# Patient Record
Sex: MALE | Race: WHITE | NOT HISPANIC OR LATINO | Employment: FULL TIME | URBAN - METROPOLITAN AREA
[De-identification: names, ages, dates, MRNs, and addresses within clinical notes are randomized per-mention and may not be internally consistent; named-entity substitution may affect disease eponyms.]

---

## 2017-01-10 ENCOUNTER — HOSPITAL ENCOUNTER (OUTPATIENT)
Dept: LAB | Facility: MEDICAL CENTER | Age: 67
End: 2017-01-10
Attending: INTERNAL MEDICINE
Payer: COMMERCIAL

## 2017-01-10 PROCEDURE — 36415 COLL VENOUS BLD VENIPUNCTURE: CPT

## 2017-01-10 PROCEDURE — 84402 ASSAY OF FREE TESTOSTERONE: CPT

## 2017-01-10 PROCEDURE — 84403 ASSAY OF TOTAL TESTOSTERONE: CPT

## 2017-01-10 PROCEDURE — 84270 ASSAY OF SEX HORMONE GLOBUL: CPT

## 2017-01-12 LAB
SHBG SERPL-SCNC: 39 NMOL/L (ref 11–80)
TESTOST FREE MFR SERPL: 1.6 % (ref 1.6–2.9)
TESTOST FREE SERPL-MCNC: 50 PG/ML (ref 47–244)
TESTOST SERPL-MCNC: 304 NG/DL (ref 300–720)

## 2017-01-23 RX ORDER — IBUPROFEN 800 MG/1
800 TABLET ORAL EVERY 8 HOURS PRN
Status: ON HOLD | COMMUNITY
End: 2017-01-24

## 2017-01-24 ENCOUNTER — HOSPITAL ENCOUNTER (INPATIENT)
Facility: MEDICAL CENTER | Age: 67
LOS: 1 days | DRG: 038 | End: 2017-01-25
Attending: SURGERY | Admitting: SURGERY
Payer: COMMERCIAL

## 2017-01-24 PROBLEM — I65.23 BILATERAL CAROTID ARTERY STENOSIS: Status: ACTIVE | Noted: 2017-01-24

## 2017-01-24 LAB
GLUCOSE BLD-MCNC: 111 MG/DL (ref 65–99)
GLUCOSE BLD-MCNC: 123 MG/DL (ref 65–99)

## 2017-01-24 PROCEDURE — 501445 HCHG STAPLER, SKIN DISP: Performed by: SURGERY

## 2017-01-24 PROCEDURE — 770006 HCHG ROOM/CARE - MED/SURG/GYN SEMI*

## 2017-01-24 PROCEDURE — 88300 SURGICAL PATH GROSS: CPT

## 2017-01-24 PROCEDURE — A4450 NON-WATERPROOF TAPE: HCPCS | Performed by: SURGERY

## 2017-01-24 PROCEDURE — 501498 HCHG SURG-I-LOOP, MAXI (BLUE): Performed by: SURGERY

## 2017-01-24 PROCEDURE — 110371 HCHG SHELL REV 272: Performed by: SURGERY

## 2017-01-24 PROCEDURE — 160041 HCHG SURGERY MINUTES - EA ADDL 1 MIN LEVEL 4: Performed by: SURGERY

## 2017-01-24 PROCEDURE — 700111 HCHG RX REV CODE 636 W/ 250 OVERRIDE (IP): Performed by: SURGERY

## 2017-01-24 PROCEDURE — 110382 HCHG SHELL REV 271: Performed by: SURGERY

## 2017-01-24 PROCEDURE — 501422 HCHG SPONGE, SURGIFOAM 100: Performed by: SURGERY

## 2017-01-24 PROCEDURE — 501838 HCHG SUTURE GENERAL: Performed by: SURGERY

## 2017-01-24 PROCEDURE — 51798 US URINE CAPACITY MEASURE: CPT

## 2017-01-24 PROCEDURE — 500043 HCHG BAG-A-JET: Performed by: SURGERY

## 2017-01-24 PROCEDURE — 700111 HCHG RX REV CODE 636 W/ 250 OVERRIDE (IP)

## 2017-01-24 PROCEDURE — 500888 HCHG PACK, MINOR BASIN: Performed by: SURGERY

## 2017-01-24 PROCEDURE — 160048 HCHG OR STATISTICAL LEVEL 1-5: Performed by: SURGERY

## 2017-01-24 PROCEDURE — 03CK0ZZ EXTIRPATION OF MATTER FROM RIGHT INTERNAL CAROTID ARTERY, OPEN APPROACH: ICD-10-PCS | Performed by: SURGERY

## 2017-01-24 PROCEDURE — C2628 CATHETER, OCCLUSION: HCPCS | Performed by: SURGERY

## 2017-01-24 PROCEDURE — 770022 HCHG ROOM/CARE - ICU (200)

## 2017-01-24 PROCEDURE — 501837 HCHG SUTURE CV: Performed by: SURGERY

## 2017-01-24 PROCEDURE — 160035 HCHG PACU - 1ST 60 MINS PHASE I: Performed by: SURGERY

## 2017-01-24 PROCEDURE — 700105 HCHG RX REV CODE 258: Performed by: SURGERY

## 2017-01-24 PROCEDURE — 03CM0ZZ EXTIRPATION OF MATTER FROM RIGHT EXTERNAL CAROTID ARTERY, OPEN APPROACH: ICD-10-PCS | Performed by: SURGERY

## 2017-01-24 PROCEDURE — 160036 HCHG PACU - EA ADDL 30 MINS PHASE I: Performed by: SURGERY

## 2017-01-24 PROCEDURE — 502240 HCHG MISC OR SUPPLY RC 0272: Performed by: SURGERY

## 2017-01-24 PROCEDURE — 160009 HCHG ANES TIME/MIN: Performed by: SURGERY

## 2017-01-24 PROCEDURE — 500698 HCHG HEMOCLIP, MEDIUM: Performed by: SURGERY

## 2017-01-24 PROCEDURE — 501499 HCHG SURG-I-LOOP, MINI (BLUE): Performed by: SURGERY

## 2017-01-24 PROCEDURE — 82962 GLUCOSE BLOOD TEST: CPT

## 2017-01-24 PROCEDURE — 700101 HCHG RX REV CODE 250

## 2017-01-24 PROCEDURE — 160002 HCHG RECOVERY MINUTES (STAT): Performed by: SURGERY

## 2017-01-24 PROCEDURE — 700105 HCHG RX REV CODE 258

## 2017-01-24 PROCEDURE — 160029 HCHG SURGERY MINUTES - 1ST 30 MINS LEVEL 4: Performed by: SURGERY

## 2017-01-24 PROCEDURE — 500700 HCHG HEMOCLIP, SMALL (RED): Performed by: SURGERY

## 2017-01-24 RX ORDER — ATORVASTATIN CALCIUM 40 MG/1
40 TABLET, FILM COATED ORAL NIGHTLY
Status: ON HOLD | COMMUNITY
End: 2017-02-14

## 2017-01-24 RX ORDER — SODIUM CHLORIDE 9 MG/ML
INJECTION, SOLUTION INTRAVENOUS
Status: COMPLETED
Start: 2017-01-24 | End: 2017-01-24

## 2017-01-24 RX ORDER — ALPRAZOLAM 0.5 MG/1
0.5 TABLET ORAL
COMMUNITY
End: 2018-08-29

## 2017-01-24 RX ORDER — OXYCODONE HYDROCHLORIDE 5 MG/1
10 TABLET ORAL
Status: DISCONTINUED | OUTPATIENT
Start: 2017-01-24 | End: 2017-01-25 | Stop reason: HOSPADM

## 2017-01-24 RX ORDER — LISINOPRIL 20 MG/1
20 TABLET ORAL DAILY
COMMUNITY
End: 2017-02-13

## 2017-01-24 RX ORDER — HYDROCHLOROTHIAZIDE 12.5 MG/1
12.5 CAPSULE, GELATIN COATED ORAL DAILY
COMMUNITY
End: 2018-06-07

## 2017-01-24 RX ORDER — ONDANSETRON 2 MG/ML
4 INJECTION INTRAMUSCULAR; INTRAVENOUS EVERY 4 HOURS PRN
Status: DISCONTINUED | OUTPATIENT
Start: 2017-01-24 | End: 2017-01-25 | Stop reason: HOSPADM

## 2017-01-24 RX ORDER — LIDOCAINE HYDROCHLORIDE 10 MG/ML
INJECTION, SOLUTION INFILTRATION; PERINEURAL
Status: COMPLETED
Start: 2017-01-24 | End: 2017-01-24

## 2017-01-24 RX ORDER — SODIUM CHLORIDE 9 MG/ML
1000 INJECTION, SOLUTION INTRAVENOUS
Status: COMPLETED | OUTPATIENT
Start: 2017-01-24 | End: 2017-01-25

## 2017-01-24 RX ORDER — MAGNESIUM HYDROXIDE 1200 MG/15ML
LIQUID ORAL
Status: DISCONTINUED | OUTPATIENT
Start: 2017-01-24 | End: 2017-01-24 | Stop reason: HOSPADM

## 2017-01-24 RX ORDER — HYDROMORPHONE HYDROCHLORIDE 2 MG/ML
INJECTION, SOLUTION INTRAMUSCULAR; INTRAVENOUS; SUBCUTANEOUS
Status: COMPLETED
Start: 2017-01-24 | End: 2017-01-24

## 2017-01-24 RX ORDER — IBUPROFEN 200 MG
800 TABLET ORAL
COMMUNITY
End: 2019-02-01

## 2017-01-24 RX ORDER — OXYCODONE HYDROCHLORIDE 5 MG/1
5 TABLET ORAL
Status: DISCONTINUED | OUTPATIENT
Start: 2017-01-24 | End: 2017-01-25 | Stop reason: HOSPADM

## 2017-01-24 RX ADMIN — LIDOCAINE HYDROCHLORIDE: 10 INJECTION, SOLUTION INFILTRATION; PERINEURAL at 14:15

## 2017-01-24 RX ADMIN — SODIUM CHLORIDE 1000 ML: 9 INJECTION, SOLUTION INTRAVENOUS at 14:34

## 2017-01-24 RX ADMIN — CEFAZOLIN SODIUM 1000 MG: 1 INJECTION, SOLUTION INTRAVENOUS at 23:06

## 2017-01-24 RX ADMIN — HYDROMORPHONE HYDROCHLORIDE 0.5 MG: 2 INJECTION INTRAMUSCULAR; INTRAVENOUS; SUBCUTANEOUS at 18:32

## 2017-01-24 RX ADMIN — POTASSIUM CHLORIDE: 2 INJECTION, SOLUTION, CONCENTRATE INTRAVENOUS at 23:19

## 2017-01-24 RX ADMIN — SODIUM CHLORIDE 100 ML: 9 INJECTION, SOLUTION INTRAVENOUS at 17:45

## 2017-01-24 RX ADMIN — PHENYLEPHRINE HYDROCHLORIDE 40 MG: 10 INJECTION INTRAVENOUS at 17:45

## 2017-01-24 ASSESSMENT — COPD QUESTIONNAIRES
DO YOU EVER COUGH UP ANY MUCUS OR PHLEGM?: NO/ONLY WITH OCCASIONAL COLDS OR INFECTIONS
COPD SCREENING SCORE: 2
DURING THE PAST 4 WEEKS HOW MUCH DID YOU FEEL SHORT OF BREATH: NONE/LITTLE OF THE TIME
HAVE YOU SMOKED AT LEAST 100 CIGARETTES IN YOUR ENTIRE LIFE: NO/DON'T KNOW

## 2017-01-24 ASSESSMENT — PAIN SCALES - GENERAL
PAINLEVEL_OUTOF10: 0
PAINLEVEL_OUTOF10: 0
PAINLEVEL_OUTOF10: 3
PAINLEVEL_OUTOF10: 0
PAINLEVEL_OUTOF10: 1
PAINLEVEL_OUTOF10: 0

## 2017-01-24 ASSESSMENT — LIFESTYLE VARIABLES: EVER_SMOKED: NEVER

## 2017-01-24 NOTE — IP AVS SNAPSHOT
" <p align=\"LEFT\"><IMG SRC=\"//[email or web address]\" alt=\"Image\" WIDTH=\"50%\" HEIGHT=\"200\" BORDER=\"\"></p>                   Name:Yoan Ortega  Medical Record Number:[id number]  CSN: [id number]    YOB: 1950   Age: 66 y.o.  Sex: male  HT:1.778 m (5' 10\") WT: 112.7 kg (248 lb 7.3 oz)          Admit Date: 1/24/2017     Discharge Date:   Today's Date: 1/25/2017  Attending Doctor:  Dominik Soriano M.D.                  Allergies:  Review of patient's allergies indicates no known allergies.          Your appointments     Feb 02, 2017  2:30 PM   NEW PATIENT with Raghav Díaz MD,[facility] (--)    [address]  [facility] [id number] [phone]              Follow-up Information     1. Schedule an appointment as soon as possible for a visit with Dominik Soriano M.D..    Specialties:  Surgery, Radiology    Why:  please call office to follow up in 1-2 weeks    Contact information    [address]  [facility] [id number] [phone]           Medication List      Take these Medications        Instructions    alprazolam 0.5 MG Tabs   Commonly known as:  XANAX    Take 0.5 mg by mouth 1 time daily as needed for Sleep.   Dose:  0.5 mg       aspirin EC 81 MG Tbec   Commonly known as:  ECOTRIN    Take 81 mg by mouth every day.   Dose:  81 mg       atorvastatin 40 MG Tabs   Commonly known as:  LIPITOR    Take 40 mg by mouth every evening.   Dose:  40 mg       CENTRUM Tabs    Take 1 Tab by mouth every day.   Dose:  1 Tab       hydrochlorothiazide 12.5 MG capsule   Commonly known as:  MICROZIDE    Take 12.5 mg by mouth every day.   Dose:  12.5 mg       ibuprofen 200 MG Tabs   Commonly known as:  MOTRIN    Take 800 mg by mouth 1 time daily as needed.   Dose:  800 mg       insulin lispro 100 UNIT/ML Soln   Commonly known as:  HUMALOG    Inject 13-22 Units as instructed 2 Times a Day. 22 units and 13 units in pm   Dose: "  13-22 Units       lisinopril 20 MG Tabs   Commonly known as:  PRINIVIL    Take 20 mg by mouth every day.   Dose:  20 mg       TOUJEO SOLOSTAR 300 UNIT/ML Sopn   Generic drug:  Insulin Glargine    Inject 52 Units as instructed every day.   Dose:  52 Units       VITAMIN D PO    Take 1 Tab by mouth every day.   Dose:  1 Tab

## 2017-01-24 NOTE — IP AVS SNAPSHOT
1/25/2017          Yoan Dacosta Shannon  [address]  [place] [address]    Dear Yoan:    [facility] wants to ensure your discharge home is safe and you or your loved ones have had all your questions answered regarding your care after you leave the hospital.    You may receive a telephone call within two days of your discharge.  This call is to make certain you understand your discharge instructions as well as ensure we provided you with the best care possible during your stay with us.     The call will only last approximately 3-5 minutes and will be done by a nurse.    Once again, we want to ensure your discharge home is safe and that you have a clear understanding of any next steps in your care.  If you have any questions or concerns, please do not hesitate to contact us, we are here for you.  Thank you for choosing [facility] for your healthcare needs.    Sincerely,    Zafar Tate    [facility]

## 2017-01-24 NOTE — IP AVS SNAPSHOT
" After Visit Summary                                                                                                                  Name:Yoan Ortega  Medical Record Number:[id number]  CSN: [id number]    YOB: 1950   Age: 66 y.o.  Sex: male  HT:1.778 m (5' 10\") WT: 112.7 kg (248 lb 7.3 oz)          Admit Date: 1/24/2017     Discharge Date:   Today's Date: 1/25/2017  Attending Doctor:  Dominik Soriano M.D.                  Allergies:  Review of patient's allergies indicates no known allergies.            Discharge Instructions       Discharge Instructions    Discharged to home by car with relative. Discharged via walking, hospital escort: Refused.  Special equipment needed: Not Applicable    Be sure to schedule a follow-up appointment with your primary care doctor or any specialists as instructed.     Discharge Plan:   Diet Plan: Discussed  Activity Level: Discussed  Confirmed Follow up Appointment: Patient to Call and Schedule Appointment  Confirmed Symptoms Management: Discussed  Medication Reconciliation Updated: Yes  Influenza Vaccine Indication: Not indicated: Previously immunized this influenza season and > 8 years of age    I understand that a diet low in cholesterol, fat, and sodium is recommended for good health. Unless I have been given specific instructions below for another diet, I accept this instruction as my diet prescription.   Other diet: diabetic     Special Instructions:   Pt to hold home BP meds for 48 hrs   F/u 1-2 weeks    · Is patient discharged on Warfarin / Coumadin?   No     · Is patient Post Blood Transfusion?  No    Depression / Suicide Risk    As you are discharged from this [facility], it is important to learn how to keep safe from harming yourself.    Recognize the warning signs:  · Abrupt changes in personality, positive or negative- including increase in energy   · Giving away possessions  · Change in eating patterns- significant weight changes-  " positive or negative  · Change in sleeping patterns- unable to sleep or sleeping all the time   · Unwillingness or inability to communicate  · Depression  · Unusual sadness, discouragement and loneliness  · Talk of wanting to die  · Neglect of personal appearance   · Rebelliousness- reckless behavior  · Withdrawal from people/activities they love  · Confusion- inability to concentrate     If you or a loved one observes any of these behaviors or has concerns about self-harm, here's what you can do:  · Talk about it- your feelings and reasons for harming yourself  · Remove any means that you might use to hurt yourself (examples: pills, rope, extension cords, firearm)  · Get professional help from the community (Mental Health, Substance Abuse, psychological counseling)  · Do not be alone:Call your Safe Contact- someone whom you trust who will be there for you.  · Call your local CRISIS HOTLINE 268-4540 or 412-498-3321  · Call your local Children's Mobile Crisis Response Team [place] (515) 350-6239 or www.T[employer]ellus  · Call the toll free National Suicide Prevention Hotlines   · National Suicide Prevention Lifeline 928-299-DLAR (9151)  · National Hope Line Network 800-SUICIDE (542-4843)      Carotid Stenosis and Carotid Endarterectomy  The carotid arteries are the large arteries in the neck that supply blood to the brain. Carotid stenosis is the narrowing of the carotid arteries. This narrowing can put you at risk for a stroke because it decreases blood flow to the brain (ischemic stroke or transient ischemic attack [TIA]). Carotid stenosis is also called carotid artery disease.  CAUSES   Carotid stenosis is usually caused by a buildup of plaque in the arteries. Plaque is also called atherosclerosis. Plaque can build up in any of your arteries. This commonly occurs as we grow older.  SYMPTOMS   Symptoms of decreased blood to your brain include:   · Feeling faint.  · Numbness or weakness in the arms or  legs.  · Difficulty with movements.  · Difficulty speaking.  · Vision problems.  If the arteries are left untreated, you are at risk of having a stroke or TIA.  TREATMENT   Nonsurgical Treatment  If appropriate, your caregiver will suggest medical options other than surgery. These include taking aspirin or other medicines that thin your blood (anticoagulants). Your caregiver can help you decide if these are acceptable treatment methods for you.  Surgical Treatment: Carotid Endarterectomy  A carotid endarterectomy is a surgical procedure to clear blockages in the carotid artery.  RISKS AND COMPLICATIONS  · Bleeding.  · Infection.  · Stroke or TIA.  BEFORE THE PROCEDURE   · Do not eat or drink for as long as directed by your caregiver before the procedure.  · Your caregiver will advise you if there are any medicines that need to be withheld before the surgery, and for how long.  PROCEDURE   You will be given a drug to make you sleep (general anesthesia). After you receive the anesthetic, the surgeon will make a small cut (incision) in your neck to expose the artery. An incision is made in the artery and the plaque is removed. The artery is then repaired and the incision is closed in your neck with stitches (sutures).  AFTER THE PROCEDURE   You will stay in the hospital right after surgery. Recovery time varies, depending on your age, condition, general health, and other factors. You are usually able to return to a normal lifestyle within a few weeks.  HOME CARE INSTRUCTIONS   · It is normal to be sore for a couple weeks after surgery. See your caregiver if this seems to be getting worse rather than better.  · Take showers, not baths, for a few days after surgery or until instructed otherwise by your caregiver. Do not take a bath or swim until directed by your surgeon.  · Only take over-the-counter or prescription medicines for pain, discomfort, or fever as directed by your caregiver.  · An anticoagulant may be  prescribed after surgery. This medicine should be taken exactly as directed.  · Change bandages (dressings) as directed by your caregiver.  · Resume your normal activities as directed by your caregiver.  · Avoid lifting until you are instructed otherwise.  · Make an appointment to see your caregiver for suture or staple removal when instructed.  · Stop smoking if you smoke. This is a grave risk factor.  · Stop taking the pill (oral contraceptives) unless your caregiver recommends otherwise.  · Maintain good blood pressure control.  · Exercise regularly or as instructed.  · Lower blood lipids (cholesterol and triglycerides).  · Eat a heart-healthy diet. Manage heart problems if they are contributing to your risk.  SEEK MEDICAL CARE IF:   · There is increased bleeding from the wound.  · You notice redness, swelling, or increasing pain in the wound.  · You notice swelling in your neck or have difficulty breathing or talking.  · You notice a bad smell or pus coming from the wound or dressing.  · You have an oral temperature above 102° F (38.9° C).  SEEK IMMEDIATE MEDICAL CARE IF:   · Your initial symptoms are getting worse instead of better.  · You develop any abnormal bruising or bleeding.  · You develop a rash.  · You have difficulty breathing.  · You develop any reaction or side effects to medicine given.  · You develop chest pain, shortness of breath, or pain or swelling in your legs.  · You have a return of symptoms or problems that caused you to have this surgery.  · You develop a temporary loss of vision.  · You develop temporary numbness on one side.  · You develop a temporary inability to speak (aphasia).  · You develop temporary areas of weakness.  · You have problems or concerns that have not been answered.  MAKE SURE YOU:   · Understand these instructions.  · Will watch your condition.  · Will get help right away if you are not doing well or get worse.  Document Released: 12/15/2001 Document Revised:  03/11/2013 Document Reviewed: 06/25/2010  ExitCare® Patient Information ©2014 [employer].  Carotid Endarterectomy, Care After  Refer to this sheet in the next few weeks. These instructions provide you with information on caring for yourself after your procedure. Your health care provider may also give you more specific instructions. Your treatment has been planned according to current medical practices, but problems sometimes occur. Call your health care provider if you have any problems or questions after your procedure.   WHAT TO EXPECT AFTER THE PROCEDURE  You may have some pain or an ache in your neck for up to 2 weeks. This is normal. Recovery time varies depending on your age, condition, general health, and other factors. You will likely be able to return to a normal lifestyle within a few weeks.   HOME CARE INSTRUCTIONS   · Take showers if your health care provider approves. Do not take baths, swim, or use a hot tub until your health care provider approves.    · Take medicines only as directed by your health care provider. If a blood thinner (anticoagulant) is prescribed after surgery, take this medicine exactly as directed.  · Change bandages (dressings) as directed by your health care provider.    · Avoid heavy lifting or strenuous activity until your health care provider says it is okay. Resume your normal activities as directed.    · Stop smoking if you smoke. This is a risk factor for poor wound healing.    · Stop taking the pill (oral contraceptives) unless your health care provider recommends otherwise.    · Maintain good control of your blood pressure.    · Exercise regularly or as instructed by your health care provider.    · Eat a heart-healthy diet. Talk to your health care provider about how to lower blood lipids (cholesterol and triglycerides).    · Keep all follow-up visits as directed by your health care provider. Make an appointment for the removal of stitches (sutures) or staples.  SEEK  MEDICAL CARE IF:   · You have increased bleeding from the incision site.    · You notice redness, swelling, or increasing pain at the incision site.    · You notice swelling in your neck or have difficulty breathing or talking.    · You notice a bad smell or pus coming from the incision site or dressing.    · You have a fever.   · You develop a rash.    · You develop any reaction or side effects to medicine given.    SEEK IMMEDIATE MEDICAL CARE IF:   · Your initial symptoms are getting worse rather than better.    · You develop any abnormal bruising or bleeding.    · You have difficulty breathing.    · You develop chest pain, shortness of breath, or pain or swelling in your legs.    · You have a return of symptoms or problems that caused you to have this surgery.    · You develop a temporary loss of vision.    · You develop temporary numbness on one side.    · You develop a temporary inability to speak (aphasia).    · You develop temporary weakness.    MAKE SURE YOU:   · Understand these instructions.  · Will watch your condition.  · Will get help right away if you are not doing well or get worse.     This information is not intended to replace advice given to you by your health care provider. Make sure you discuss any questions you have with your health care provider.     Document Released: 07/07/2006 Document Revised: 01/08/2016 Document Reviewed: 05/21/2014  [employer] Interactive Patient Education ©2016 [employer] Inc.      Your appointments     Feb 02, 2017  2:30 PM   NEW PATIENT with Raghav Díaz MD,[facility] ([facility]    [address]  [facility] [id number] [phone]              Follow-up Information     1. Schedule an appointment as soon as possible for a visit with Dominik Soriano M.D..    Specialties:  Surgery, Radiology    Why:  please call office to follow up in 1-2 weeks    Contact information    [address]  [place] [address]  [phone]           Discharge Medication Instructions:    Below are the medications your physician expects you to take upon discharge:    Review all your home medications and newly ordered medications with your doctor and/or pharmacist. Follow medication instructions as directed by your doctor and/or pharmacist.    Please keep your medication list with you and share with your physician.               Medication List      CONTINUE taking these medications        Instructions    alprazolam 0.5 MG Tabs   Commonly known as:  XANAX    Take 0.5 mg by mouth 1 time daily as needed for Sleep.   Dose:  0.5 mg       aspirin EC 81 MG Tbec   Commonly known as:  ECOTRIN    Take 81 mg by mouth every day.   Dose:  81 mg       atorvastatin 40 MG Tabs   Commonly known as:  LIPITOR    Take 40 mg by mouth every evening.   Dose:  40 mg       CENTRUM Tabs    Take 1 Tab by mouth every day.   Dose:  1 Tab       hydrochlorothiazide 12.5 MG capsule   Commonly known as:  MICROZIDE    Take 12.5 mg by mouth every day.   Dose:  12.5 mg       ibuprofen 200 MG Tabs   Commonly known as:  MOTRIN    Take 800 mg by mouth 1 time daily as needed.   Dose:  800 mg       insulin lispro 100 UNIT/ML Soln   Commonly known as:  HUMALOG    Inject 13-22 Units as instructed 2 Times a Day. 22 units and 13 units in pm   Dose:  13-22 Units       lisinopril 20 MG Tabs   Commonly known as:  PRINIVIL    Take 20 mg by mouth every day.   Dose:  20 mg       TOUJEO SOLOSTAR 300 UNIT/ML Sopn   Generic drug:  Insulin Glargine    Inject 52 Units as instructed every day.   Dose:  52 Units       VITAMIN D PO    Take 1 Tab by mouth every day.   Dose:  1 Tab               Instructions           Diet / Nutrition:    Follow any diet instructions given to you by your doctor or the dietician, including how much salt (sodium) you are allowed each day.    If you are overweight, talk to your doctor about a weight reduction plan.    Activity:    Remain  physically active following your doctor's instructions about exercise and activity.    Rest often.     Any time you become even a little tired or short of breath, SIT DOWN and rest.    Worsening Symptoms:    Report any of the following signs and symptoms to the doctor's office immediately:    *Pain of jaw, arm, or neck  *Chest pain not relieved by medication                               *Dizziness or loss of consciousness  *Difficulty breathing even when at rest   *More tired than usual                                       *Bleeding drainage or swelling of surgical site  *Swelling of feet, ankles, legs or stomach                 *Fever (>100ºF)  *Pink or blood tinged sputum  *Weight gain (3lbs/day or 5lbs /week)           *Shock from internal defibrillator (if applicable)  *Palpitations or irregular heartbeats                *Cool and/or numb extremities    Stroke Awareness    Common Risk Factors for Stroke include:    Age  Atrial Fibrillation  Carotid Artery Stenosis  Diabetes Mellitus  Excessive alcohol consumption  High blood pressure  Overweight   Physical inactivity  Smoking    Warning signs and symptoms of a stroke include:    *Sudden numbness or weakness of the face, arm or leg (especially on one side of the body).  *Sudden confusion, trouble speaking or understanding.  *Sudden trouble seeing in one or both eyes.  *Sudden trouble walking, dizziness, loss of balance or coordination.Sudden severe headache with no known cause.    It is very important to get treatment quickly when a stroke occurs. If you experience any of the above warning signs, call 911 immediately.                   Disclaimer         Quit Smoking / Tobacco Use:    I understand the use of any tobacco products increases my chance of suffering from future heart disease or stroke and could cause other illnesses which may shorten my life. Quitting the use of tobacco products is the single most important thing I can do to improve my health. For  further information on smoking / tobacco cessation call a Toll Free Quit Line at 1-624.706.8494 (*National [employer] [place]) or 1-850.815.1042 (American Lung Association) or you can access the web based program at www.lungusa.org.    [place] Tobacco Users Help Line:  (113) 926-5372       Toll Free: 1-684.295.7195  Quit Tobacco Program [facility] (709)726-1274    Crisis Hotline:    [place] Crisis Hotline:  7-612-AUNNJLL or 1-212.424.3300    [place] Crisis Hotline:    1-691.376.9621 or 968-929-5655    Discharge Survey:   Thank you for choosing [facility]. We hope we did everything we could to make your hospital stay a pleasant one. You may be receiving a phone survey and we would appreciate your time and participation in answering the questions. Your input is very valuable to us in our efforts to improve our service to our patients and their families.        My signature on this form indicates that:    1. I have reviewed and understand the above information.  2. My questions regarding this information have been answered to my satisfaction.  3. I have formulated a plan with my discharge nurse to obtain my prescribed medications for home.                  Disclaimer         __________________________________                     __________       ________                       Patient Signature                                                 Date                    Time

## 2017-01-24 NOTE — IP AVS SNAPSHOT
[employer] Access Code: [email or web address]  Expires: 2/24/2017 11:28 AM    [employer]  A secure, online tool to manage your health information     [employer]’s [employer]® is a secure, online tool that connects you to your personalized health information from the privacy of your home -- day or night - making it very easy for you to manage your healthcare. Once the activation process is completed, you can even access your medical information using the [employer] payton, which is available for free in the Apple Payton store or Google Play store.     [employer] provides the following levels of access (as shown below):   My Chart Features   [facility] Primary Care Doctor [facility]  Specialists [facility]  Urgent  Care Non-[facility]  Primary Care  Doctor   Email your healthcare team securely and privately 24/7 X X X X   Manage appointments: schedule your next appointment; view details of past/upcoming appointments X      Request prescription refills. X      View recent personal medical records, including lab and immunizations X X X X   View health record, including health history, allergies, medications X X X X   Read reports about your outpatient visits, procedures, consult and ER notes X X X X   See your discharge summary, which is a recap of your hospital and/or ER visit that includes your diagnosis, lab results, and care plan. X X       How to register for [employer]:  1. Go to  [email or web address].  2. Click on the Sign Up Now box, which takes you to the New Member Sign Up page. You will need to provide the following information:  a. Enter your [employer] Access Code exactly as it appears at the top of this page. (You will not need to use this code after you’ve completed the sign-up process. If you do not sign up before the expiration date, you must request a new code.)   b. Enter your date of birth.   c. Enter your home email address.   d. Click Submit, and follow the next screen’s instructions.  3. Create a [employer] ID. This will be your [employer]  login ID and cannot be changed, so think of one that is secure and easy to remember.  4. Create a [employer] password. You can change your password at any time.  5. Enter your Password Reset Question and Answer. This can be used at a later time if you forget your password.   6. Enter your e-mail address. This allows you to receive e-mail notifications when new information is available in Ring[employer].  7. Click Sign Up. You can now view your health information.    For assistance activating your [employer] account, call [phone]

## 2017-01-25 VITALS
OXYGEN SATURATION: 94 % | TEMPERATURE: 98.8 F | BODY MASS INDEX: 35.57 KG/M2 | HEIGHT: 70 IN | WEIGHT: 248.46 LBS | HEART RATE: 96 BPM | RESPIRATION RATE: 23 BRPM

## 2017-01-25 LAB — GLUCOSE BLD-MCNC: 97 MG/DL (ref 65–99)

## 2017-01-25 PROCEDURE — 700105 HCHG RX REV CODE 258: Performed by: SURGERY

## 2017-01-25 PROCEDURE — 700111 HCHG RX REV CODE 636 W/ 250 OVERRIDE (IP): Performed by: SURGERY

## 2017-01-25 PROCEDURE — 82962 GLUCOSE BLOOD TEST: CPT

## 2017-01-25 RX ADMIN — PHENYLEPHRINE HYDROCHLORIDE 45 MCG/MIN: 10 INJECTION INTRAVENOUS at 01:04

## 2017-01-25 RX ADMIN — CEFAZOLIN SODIUM 1000 MG: 1 INJECTION, SOLUTION INTRAVENOUS at 05:54

## 2017-01-25 ASSESSMENT — LIFESTYLE VARIABLES
ON A TYPICAL DAY WHEN YOU DRINK ALCOHOL HOW MANY DRINKS DO YOU HAVE: 1
HAVE YOU EVER FELT YOU SHOULD CUT DOWN ON YOUR DRINKING: NO
EVER_SMOKED: NEVER
TOTAL SCORE: 0
ALCOHOL_USE: YES
HOW MANY TIMES IN THE PAST YEAR HAVE YOU HAD 5 OR MORE DRINKS IN A DAY: 2
EVER HAD A DRINK FIRST THING IN THE MORNING TO STEADY YOUR NERVES TO GET RID OF A HANGOVER: NO
CONSUMPTION TOTAL: POSITIVE
AVERAGE NUMBER OF DAYS PER WEEK YOU HAVE A DRINK CONTAINING ALCOHOL: 0
TOTAL SCORE: 0
HAVE PEOPLE ANNOYED YOU BY CRITICIZING YOUR DRINKING: NO
TOTAL SCORE: 0
EVER FELT BAD OR GUILTY ABOUT YOUR DRINKING: NO

## 2017-01-25 ASSESSMENT — PAIN SCALES - GENERAL
PAINLEVEL_OUTOF10: 1
PAINLEVEL_OUTOF10: 0

## 2017-01-25 NOTE — OP REPORT
DATE OF SERVICE:  01/24/2017    OPERATING SURGEON:  Dominik Soriano MD    ASSISTANT:  Yoan Craig MD    PROCEDURE:  Right carotid endarterectomy.    ANESTHESIA:  General.    ESTIMATED BLOOD LOSS:  Less than 5 mL.    PREOPERATIVE DIAGNOSIS:  Critical right internal carotid artery stenosis with   contralateral occlusion.    POSTOPERATIVE DIAGNOSIS:  Critical right internal carotid artery stenosis with   contralateral occlusion.    SUMMARY:  A 66-year-old male who has ASCVD presented with visual changes in   his left eye including transient field cut and then central vision loss.  He   was evaluated by the emergency room and noted to have a left internal carotid   artery occlusion and high-grade right internal carotid artery stenosis.    The patient was evaluated by ophthalmology with the recommendation of a   prophylactic right carotid endarterectomy to reduce risk of stroke as well as   further eye damage on the contralateral side.    DESCRIPTION OF PROCEDURE:  The patient was taken to the operating room and   satisfactory general anesthesia was induced via endotracheal intubation.    Patient was placed in hyperextended position of the shoulders with the head   turned to the left.  The right neck was prepped and draped.  Longitudinal   incision was made through the skin and subcutaneous tissue with extension   through the platysma.  Self-retaining retractors were placed in the depths of   the wound and the common as well as external and internal carotid vessels were   skeletonized and circled with tapes.    The patient was heparinized with 5000 units of heparin intravenously.    The external followed by the internal and the common carotid vessels were   crossclamped.  Longitudinal arteriotomy was made from the common onto the   internal carotid artery beyond the plaque and a Squires-Inahara shunt was   inserted distally, back bled and inserted proximally with bleeding out of the   ____ prior to  reestablishing flow to the brain.  Endarterectomy was performed   with a spatula and eversion-type endarterectomy in the external carotid.    The vessel bed was irrigated copiously with heparinized saline and closed   primarily with running 5-0 and 6-0 Prolene suture.  The shunt was withdrawn   and the suture line back bled ____ secured and flow established from the   common into the external and the internal carotid vessel.    Good diastolic signal was noted in the distal internal carotid artery.    Patient was reversed with protamine sulfate.  Topical thrombin and Gelfoam   were used for hemostasis.  The wound was closed with running 3-0 Vicryl on the   platysma and running 4-0 Vicryl subcuticular skin stitches.  Wound was   dressed and patient sent to recovery room in good condition.  Specimen sent to   pathology, labeled carotid plaque.       ____________________________________     MD ARNOLDO BAIRES / NOELLE    DD:  01/24/2017 17:02:56  DT:  01/24/2017 17:45:51    D#:  [id number]  Job#:  [id number]    cc: Yoan Craig MD

## 2017-01-25 NOTE — OR SURGEON
Immediate Post-Operative Note      PreOp Diagnosis: critical right ICA stenosis    PostOp Diagnosis:   same    Procedure(s) (LRB):  CAROTID ENDARTERECTOMY (Right)    Surgeon(s):  GOKUL Palmer M.D.    Anesthesiologist/Type of Anesthesia:  Anesthesiologist: Ricardo Elkins M.D./General    Surgical Staff:  Circulator: Renetta Malin R.N.  Scrub Person: Richardson Maya    Specimen:   plaque    Estimated Blood Loss:   scant    Findings:       Complications:           1/24/2017 4:59 PM Dominik Soriano

## 2017-01-25 NOTE — PROGRESS NOTES
Late entry    Bedside report in PACU received from Roderick GEORGES. Pt arrived to unit, S105, with ACLS RN and CCT on transport monitor at 2250. Pt placed on ICU monitor. SCDs placed. Oriented to staff, ICU policy, and current plan of care. Pt verbalizes agreement. Pt A&Ox4, denies pain, numbness, tingling and nausea.     Chart reviewed, orders reviewed.     Pt with IV phenylephrine, titrating per MAR and MD order.     2330: Per pt, voided last at 1400 1/24. Pt attempted to void via urinal. Roughly 10 cc urine out. Bladder scan ordered per MD. Orders to bladder scan if urine >200. Result 340 mL. Discussed results with pt and MD order. Pt refuses to be straight cath. Encouraged to drink fluids and pt agreed to try and void in one hour.

## 2017-01-25 NOTE — PROGRESS NOTES
Pt BP remains stable with SBP> 110 and patient asymptomatic.  Clarified with Dr Soriano and reviewed vitals, OK to discharge per MD.  Pt ready to discharge, wife at bedside, reviewed discharge instructions, questions and concerns addressed, all lines and devices removed from pt.  All personal belongings sent with pt.  Pt refusing escort.

## 2017-01-25 NOTE — OR NURSING
PATIENT B/P DECREASING. DR. HESTER AT BEDSIDE TO TREAT PT WITH PHENYLEPHRINE BOLUS. MONITOR FOR RESPONSES. ALSO TO GIVE 1L BOLUS LR NOW

## 2017-01-25 NOTE — CARE PLAN
Problem: Safety  Goal: Will remain free from injury  Pt oriented to current plan of care. Safety and fall precautions in place. Encouraged pt to use call light for assistance to use the urinal. Pt return demonstrates appropriately. Non skid socks in place, bed in lowest position with bed brakes and alarm set. Staff present for mobility. Encouraged proper mobility techniques when transferring out of bed.     Problem: Infection  Goal: Will remain free from infection  MEWS tool in place to identify sepsis score. Standard precautions with proper hand hygiene in place. Abx therapy in place. Infection prevention measures discussed with pt, pt verbalizes teaching.     Problem: Venous Thromboembolism (VTW)/Deep Vein Thrombosis (DVT) Prevention:  Goal: Patient will participate in Venous Thrombosis (VTE)/Deep Vein Thrombosis (DVT)Prevention Measures  Encouraged to wear SCDs for 23/24 hrs/day for proper therapy unless ambulatory.     Problem: Fluid Volume:  Goal: Will maintain balanced intake and output  Monitoring fluid intake, IV fluid infusion and urine output, documented q2h. Daily weights in place.

## 2017-01-25 NOTE — PROGRESS NOTES
2 RN skin check completed    Pt with blanching redness on top of ears from nasal cannula, intact; silicone nasal cannula applied with foam.   Right heel with cracked, calloused, dry skin.   Left buttocks/hip noted to have a square area of patterned red dots.   Posterior neck area slightly swollen, per pt it's from a cyst.   Right anterior neck with surgical dressing in place, clean dry intact.

## 2017-01-25 NOTE — OR NURSING
UNABLE TO MAINTAIN B/P WITHIN NORMAL LIMITS. DR. HESTER AT BEDSIDE AND GAVE PT BOLUS OF PHENYLEPHRINE IV.  TO MONITOR FOR CHANGES

## 2017-01-25 NOTE — DISCHARGE INSTRUCTIONS
Discharge Instructions    Discharged to home by car with relative. Discharged via walking, hospital escort: Refused.  Special equipment needed: Not Applicable    Be sure to schedule a follow-up appointment with your primary care doctor or any specialists as instructed.     Discharge Plan:   Diet Plan: Discussed  Activity Level: Discussed  Confirmed Follow up Appointment: Patient to Call and Schedule Appointment  Confirmed Symptoms Management: Discussed  Medication Reconciliation Updated: Yes  Influenza Vaccine Indication: Not indicated: Previously immunized this influenza season and > 8 years of age    I understand that a diet low in cholesterol, fat, and sodium is recommended for good health. Unless I have been given specific instructions below for another diet, I accept this instruction as my diet prescription.   Other diet: diabetic     Special Instructions:   Pt to hold home BP meds for 48 hrs   F/u 1-2 weeks    · Is patient discharged on Warfarin / Coumadin?   No     · Is patient Post Blood Transfusion?  No    Depression / Suicide Risk    As you are discharged from this [facility], it is important to learn how to keep safe from harming yourself.    Recognize the warning signs:  · Abrupt changes in personality, positive or negative- including increase in energy   · Giving away possessions  · Change in eating patterns- significant weight changes-  positive or negative  · Change in sleeping patterns- unable to sleep or sleeping all the time   · Unwillingness or inability to communicate  · Depression  · Unusual sadness, discouragement and loneliness  · Talk of wanting to die  · Neglect of personal appearance   · Rebelliousness- reckless behavior  · Withdrawal from people/activities they love  · Confusion- inability to concentrate     If you or a loved one observes any of these behaviors or has concerns about self-harm, here's what you can do:  · Talk about it- your feelings and reasons for harming  yourself  · Remove any means that you might use to hurt yourself (examples: pills, rope, extension cords, firearm)  · Get professional help from the community (Mental Health, Substance Abuse, psychological counseling)  · Do not be alone:Call your Safe Contact- someone whom you trust who will be there for you.  · Call your local CRISIS HOTLINE 429-8470 or 858-267-7629  · Call your local Children's Mobile Crisis Response Team [place] (577) 082-7177 or wwwAlianza  · Call the toll free National Suicide Prevention Hotlines   · National Suicide Prevention Lifeline 017-628-MBNA (2063)  · [employer] Line Network 800-SUICIDE (007-0294)      Carotid Stenosis and Carotid Endarterectomy  The carotid arteries are the large arteries in the neck that supply blood to the brain. Carotid stenosis is the narrowing of the carotid arteries. This narrowing can put you at risk for a stroke because it decreases blood flow to the brain (ischemic stroke or transient ischemic attack [TIA]). Carotid stenosis is also called carotid artery disease.  CAUSES   Carotid stenosis is usually caused by a buildup of plaque in the arteries. Plaque is also called atherosclerosis. Plaque can build up in any of your arteries. This commonly occurs as we grow older.  SYMPTOMS   Symptoms of decreased blood to your brain include:   · Feeling faint.  · Numbness or weakness in the arms or legs.  · Difficulty with movements.  · Difficulty speaking.  · Vision problems.  If the arteries are left untreated, you are at risk of having a stroke or TIA.  TREATMENT   Nonsurgical Treatment  If appropriate, your caregiver will suggest medical options other than surgery. These include taking aspirin or other medicines that thin your blood (anticoagulants). Your caregiver can help you decide if these are acceptable treatment methods for you.  Surgical Treatment: Carotid Endarterectomy  A carotid endarterectomy is a surgical procedure to clear blockages in  the carotid artery.  RISKS AND COMPLICATIONS  · Bleeding.  · Infection.  · Stroke or TIA.  BEFORE THE PROCEDURE   · Do not eat or drink for as long as directed by your caregiver before the procedure.  · Your caregiver will advise you if there are any medicines that need to be withheld before the surgery, and for how long.  PROCEDURE   You will be given a drug to make you sleep (general anesthesia). After you receive the anesthetic, the surgeon will make a small cut (incision) in your neck to expose the artery. An incision is made in the artery and the plaque is removed. The artery is then repaired and the incision is closed in your neck with stitches (sutures).  AFTER THE PROCEDURE   You will stay in the hospital right after surgery. Recovery time varies, depending on your age, condition, general health, and other factors. You are usually able to return to a normal lifestyle within a few weeks.  HOME CARE INSTRUCTIONS   · It is normal to be sore for a couple weeks after surgery. See your caregiver if this seems to be getting worse rather than better.  · Take showers, not baths, for a few days after surgery or until instructed otherwise by your caregiver. Do not take a bath or swim until directed by your surgeon.  · Only take over-the-counter or prescription medicines for pain, discomfort, or fever as directed by your caregiver.  · An anticoagulant may be prescribed after surgery. This medicine should be taken exactly as directed.  · Change bandages (dressings) as directed by your caregiver.  · Resume your normal activities as directed by your caregiver.  · Avoid lifting until you are instructed otherwise.  · Make an appointment to see your caregiver for suture or staple removal when instructed.  · Stop smoking if you smoke. This is a grave risk factor.  · Stop taking the pill (oral contraceptives) unless your caregiver recommends otherwise.  · Maintain good blood pressure control.  · Exercise regularly or as  instructed.  · Lower blood lipids (cholesterol and triglycerides).  · Eat a heart-healthy diet. Manage heart problems if they are contributing to your risk.  SEEK MEDICAL CARE IF:   · There is increased bleeding from the wound.  · You notice redness, swelling, or increasing pain in the wound.  · You notice swelling in your neck or have difficulty breathing or talking.  · You notice a bad smell or pus coming from the wound or dressing.  · You have an oral temperature above 102° F (38.9° C).  SEEK IMMEDIATE MEDICAL CARE IF:   · Your initial symptoms are getting worse instead of better.  · You develop any abnormal bruising or bleeding.  · You develop a rash.  · You have difficulty breathing.  · You develop any reaction or side effects to medicine given.  · You develop chest pain, shortness of breath, or pain or swelling in your legs.  · You have a return of symptoms or problems that caused you to have this surgery.  · You develop a temporary loss of vision.  · You develop temporary numbness on one side.  · You develop a temporary inability to speak (aphasia).  · You develop temporary areas of weakness.  · You have problems or concerns that have not been answered.  MAKE SURE YOU:   · Understand these instructions.  · Will watch your condition.  · Will get help right away if you are not doing well or get worse.  Document Released: 12/15/2001 Document Revised: 03/11/2013 Document Reviewed: 06/25/2010  ExitCare® Patient Information ©2014 [employer].  Carotid Endarterectomy, Care After  Refer to this sheet in the next few weeks. These instructions provide you with information on caring for yourself after your procedure. Your health care provider may also give you more specific instructions. Your treatment has been planned according to current medical practices, but problems sometimes occur. Call your health care provider if you have any problems or questions after your procedure.   WHAT TO EXPECT AFTER THE PROCEDURE  You  may have some pain or an ache in your neck for up to 2 weeks. This is normal. Recovery time varies depending on your age, condition, general health, and other factors. You will likely be able to return to a normal lifestyle within a few weeks.   HOME CARE INSTRUCTIONS   · Take showers if your health care provider approves. Do not take baths, swim, or use a hot tub until your health care provider approves.    · Take medicines only as directed by your health care provider. If a blood thinner (anticoagulant) is prescribed after surgery, take this medicine exactly as directed.  · Change bandages (dressings) as directed by your health care provider.    · Avoid heavy lifting or strenuous activity until your health care provider says it is okay. Resume your normal activities as directed.    · Stop smoking if you smoke. This is a risk factor for poor wound healing.    · Stop taking the pill (oral contraceptives) unless your health care provider recommends otherwise.    · Maintain good control of your blood pressure.    · Exercise regularly or as instructed by your health care provider.    · Eat a heart-healthy diet. Talk to your health care provider about how to lower blood lipids (cholesterol and triglycerides).    · Keep all follow-up visits as directed by your health care provider. Make an appointment for the removal of stitches (sutures) or staples.  SEEK MEDICAL CARE IF:   · You have increased bleeding from the incision site.    · You notice redness, swelling, or increasing pain at the incision site.    · You notice swelling in your neck or have difficulty breathing or talking.    · You notice a bad smell or pus coming from the incision site or dressing.    · You have a fever.   · You develop a rash.    · You develop any reaction or side effects to medicine given.    SEEK IMMEDIATE MEDICAL CARE IF:   · Your initial symptoms are getting worse rather than better.    · You develop any abnormal bruising or bleeding.     · You have difficulty breathing.    · You develop chest pain, shortness of breath, or pain or swelling in your legs.    · You have a return of symptoms or problems that caused you to have this surgery.    · You develop a temporary loss of vision.    · You develop temporary numbness on one side.    · You develop a temporary inability to speak (aphasia).    · You develop temporary weakness.    MAKE SURE YOU:   · Understand these instructions.  · Will watch your condition.  · Will get help right away if you are not doing well or get worse.     This information is not intended to replace advice given to you by your health care provider. Make sure you discuss any questions you have with your health care provider.     Document Released: 07/07/2006 Document Revised: 01/08/2016 Document Reviewed: 05/21/2014  Elsevier Interactive Patient Education ©2016 [employer]

## 2017-01-25 NOTE — PROGRESS NOTES
Surgical Progress Note    Author: Dominik Soriano Date & Time created: 2017   6:17 AM     Interval Events:  Pt neurologically intact.  BP supported by neosynephrine  ROS  Hemodynamics:  Temp (24hrs), Av.8 °C (98.2 °F), Min:36.5 °C (97.7 °F), Max:37 °C (98.6 °F)  Temperature: 36.8 °C (98.3 °F)  Pulse  Av.1  Min: 55  Max: 103Heart Rate (Monitored): 71  Arterial BP: 100/65 mmHg, NIBP: 101/58 mmHg     Respiratory:    Respiration: 17, Pulse Oximetry: 98 %, O2 Daily Delivery Respiratory : Silicone Nasal Cannula     Work Of Breathing / Effort: Mild  RUL Breath Sounds: Clear, RML Breath Sounds: Diminished, RLL Breath Sounds: Diminished, JAZIEL Breath Sounds: Clear, LLL Breath Sounds: Diminished  Neuro:  GCS Total Castor Coma Score: 15     Fluids:    Intake/Output Summary (Last 24 hours) at 17 0617  Last data filed at 17 0600   Gross per 24 hour   Intake 3678.36 ml   Output    650 ml   Net 3028.36 ml     Weight: 112.7 kg (248 lb 7.3 oz)  Current Diet Order   Procedures   • DIET ORDER     Physical Exam  Labs:  Recent Results (from the past 24 hour(s))   ACCU-CHEK GLUCOSE    Collection Time: 17  2:16 PM   Result Value Ref Range    Glucose - Accu-Ck 111 (H) 65 - 99 mg/dL   ACCU-CHEK GLUCOSE    Collection Time: 17  7:30 PM   Result Value Ref Range    Glucose - Accu-Ck 123 (H) 65 - 99 mg/dL     Medical Decision Making, by Problem:  Active Hospital Problems    Diagnosis   • Bilateral carotid artery stenosis [I65.23]     Plan:  Home later today if BP acceptable    Quality Measures:  Core Measures    Discussed patient condition with RN

## 2017-01-26 NOTE — DISCHARGE SUMMARY
FINAL DISCHARGE DIAGNOSES:  1.  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  2.  High-grade right internal artery stenosis.  3.  Obesity.  4.  History of chronic obstructive pulmonary disease.  5.  Insulin dependent diabetes.  6.  Hypertension.    SUMMARY:  This 66-year-old male was admitted for prophylactic right carotid   endarterectomy.    He presented with visual changes in his left eye and was found in the   emergency room to have an occluded left internal carotid artery.    His symptoms and findings were corroborated by ophthalmology with a   recommendation that the patient undergo a contralateral right carotid   endarterectomy for high-grade stenosis ____ cerebral and ocular flow.    This was done without incident.  Postoperatively, he had some problems with   hypotension which was treated with Rakan-Synephrine.    At the time of the patient's discharge, his blood pressure was stable.  He was   discharged on his admitting medications as well as Norco 5/325 mg, 1-2 every   4 hours as necessary.  The patient will return for followup in 1 week.  We   will hold his BP meds for 48 hours.       ____________________________________     MD ARNOLDO BAIRES / NTS    DD:  01/25/2017 06:20:46  DT:  01/25/2017 08:43:53    D#:  [id number]  Job#:  [id number]

## 2017-02-02 ENCOUNTER — OFFICE VISIT (OUTPATIENT)
Dept: CARDIOLOGY | Facility: MEDICAL CENTER | Age: 67
End: 2017-02-02
Payer: COMMERCIAL

## 2017-02-02 VITALS
WEIGHT: 240 LBS | OXYGEN SATURATION: 96 % | DIASTOLIC BLOOD PRESSURE: 90 MMHG | HEART RATE: 100 BPM | SYSTOLIC BLOOD PRESSURE: 140 MMHG | BODY MASS INDEX: 34.44 KG/M2

## 2017-02-02 DIAGNOSIS — E11.9 CONTROLLED TYPE 2 DIABETES MELLITUS WITHOUT COMPLICATION, WITH LONG-TERM CURRENT USE OF INSULIN (HCC): ICD-10-CM

## 2017-02-02 DIAGNOSIS — Z79.4 CONTROLLED TYPE 2 DIABETES MELLITUS WITHOUT COMPLICATION, WITH LONG-TERM CURRENT USE OF INSULIN (HCC): ICD-10-CM

## 2017-02-02 DIAGNOSIS — R07.9 CHEST PAIN AT REST: ICD-10-CM

## 2017-02-02 DIAGNOSIS — E78.5 HYPERLIPIDEMIA, UNSPECIFIED HYPERLIPIDEMIA TYPE: ICD-10-CM

## 2017-02-02 DIAGNOSIS — I10 ESSENTIAL HYPERTENSION: ICD-10-CM

## 2017-02-02 DIAGNOSIS — I65.23 BILATERAL CAROTID ARTERY STENOSIS: ICD-10-CM

## 2017-02-02 DIAGNOSIS — E66.9 OBESITY (BMI 35.0-39.9 WITHOUT COMORBIDITY): ICD-10-CM

## 2017-02-02 LAB — EKG IMPRESSION: NORMAL

## 2017-02-02 PROCEDURE — 93000 ELECTROCARDIOGRAM COMPLETE: CPT | Performed by: INTERNAL MEDICINE

## 2017-02-02 PROCEDURE — 99204 OFFICE O/P NEW MOD 45 MIN: CPT | Performed by: INTERNAL MEDICINE

## 2017-02-02 RX ORDER — LOSARTAN POTASSIUM 100 MG/1
100 TABLET ORAL DAILY
Qty: 90 TAB | Refills: 3 | Status: SHIPPED | OUTPATIENT
Start: 2017-02-02 | End: 2017-02-13

## 2017-02-02 RX ORDER — LOSARTAN POTASSIUM 50 MG/1
50 TABLET ORAL DAILY
COMMUNITY
End: 2017-02-02

## 2017-02-02 NOTE — MR AVS SNAPSHOT
Yoan Ortega   2017 2:30 PM   Office Visit   MRN: [id number]    Department:  [facility]   Dept Phone:  [phone]    Description:  Male : 1950   Provider:  Raghav Díaz MD,[facility]           Reason for Visit     New Patient review adn stress test      Allergies as of 2017     No Known Allergies      You were diagnosed with     Essential hypertension   [5867885]       Bilateral carotid artery stenosis   [553361]       Hyperlipidemia, unspecified hyperlipidemia type   [1785678]       Chest pain at rest   [125021]       Controlled type 2 diabetes mellitus without complication, with long-term current use of insulin (CMS-[facility])   [4027009]       Obesity (BMI 35.0-39.9 without comorbidity) ([facility])   [542217]         Vital Signs     Blood Pressure Pulse Weight Oxygen Saturation Smoking Status       140/90 mmHg 100 108.863 kg (240 lb) 96% Never Smoker        Basic Information     Date Of Birth Sex Race Ethnicity Preferred Language    1950 Male White Non- English      Your appointments     2017  8:15 AM   ECHO with ECHO [facility]   ECHOCARDIOLOGY [facility])    [address]  [facility] [address]   [phone]           No prep            May 02, 2017 10:40 AM   FOLLOW UP with ANEL Avila   [facility] (--)    [address]  [place] [address] [phone]              Problem List              ICD-10-CM Priority Class Noted - Resolved    Bilateral carotid artery stenosis I65.23   2017 - Present      Health Maintenance        Date Due Completion Dates    IMM DTaP/Tdap/Td Vaccine (1 - Tdap) 1970 ---    COLONOSCOPY 2001 ---    IMM ZOSTER VACCINE 2011 ---    IMM PNEUMOCOCCAL 65+ (ADULT) LOW/MEDIUM RISK SERIES (1 of 2 - PCV13) 2016 ---            Results       Current Immunizations     Influenza Vaccine Adult HD 10/25/2016      Below and/or attached are the  medications your provider expects you to take. Review all of your home medications and newly ordered medications with your provider and/or pharmacist. Follow medication instructions as directed by your provider and/or pharmacist. Please keep your medication list with you and share with your provider. Update the information when medications are discontinued, doses are changed, or new medications (including over-the-counter products) are added; and carry medication information at all times in the event of emergency situations     Allergies:  No Known Allergies          Medications  Valid as of: February 02, 2017 -  3:23 PM    Generic Name Brand Name Tablet Size Instructions for use    ALPRAZolam (Tab) XANAX 0.5 MG Take 0.5 mg by mouth 1 time daily as needed for Sleep.        Aspirin (Tablet Delayed Response) ECOTRIN 81 MG Take 81 mg by mouth every day.        Atorvastatin Calcium (Tab) LIPITOR 40 MG Take 40 mg by mouth every evening.        Cholecalciferol   Take 1 Tab by mouth every day.        HydroCHLOROthiazide (Cap) MICROZIDE 12.5 MG Take 12.5 mg by mouth every day.        Ibuprofen (Tab) MOTRIN 200 MG Take 800 mg by mouth 1 time daily as needed.        Insulin Glargine (Solution Pen-injector) Insulin Glargine 300 UNIT/ML Inject 52 Units as instructed every day.        Insulin Lispro (Solution) HUMALOG 100 UNIT/ML Inject 13-22 Units as instructed 2 Times a Day. 22 units and 13 units in pm        Lisinopril (Tab) PRINIVIL 20 MG Take 20 mg by mouth every day.        Losartan Potassium (Tab) COZAAR 100 MG Take 1 Tab by mouth every day.        Multiple Vitamins-Minerals (Tab) CENTRUM  Take 1 Tab by mouth every day.        .                 Medicines prescribed today were sent to:     [facility] #[id number] - [place], [place] - [address]    [address] [facility] [place] [address]    Phone: [phone] Fax: [phone]    Open 24 Hours?: No      Medication refill instructions:       If your prescription  bottle indicates you have medication refills left, it is not necessary to call your provider’s office. Please contact your pharmacy and they will refill your medication.    If your prescription bottle indicates you do not have any refills left, you may request refills at any time through one of the following ways: The online [employer] system (except Urgent Care), by calling your provider’s office, or by asking your pharmacy to contact your provider’s office with a refill request. Medication refills are processed only during regular business hours and may not be available until the next business day. Your provider may request additional information or to have a follow-up visit with you prior to refilling your medication.   *Please Note: Medication refills are assigned a new Rx number when refilled electronically. Your pharmacy may indicate that no refills were authorized even though a new prescription for the same medication is available at the pharmacy. Please request the medicine by name with the pharmacy before contacting your provider for a refill.        Your To Do List     Future Labs/Procedures Complete By Expires    ECHOCARDIOGRAM COMP W/O CONT  As directed 2/2/2018         [employer] Access Code: [email or web address]  Expires: 2/24/2017 11:28 AM    [employer]  A secure, online tool to manage your health information     [employer]’s [employer]® is a secure, online tool that connects you to your personalized health information from the privacy of your home -- day or night - making it very easy for you to manage your healthcare. Once the activation process is completed, you can even access your medical information using the [employer] payton, which is available for free in the Apple Payton store or Google Play store.     [employer] provides the following levels of access (as shown below):   My Chart Features   [facility] Doctor [facility]  Specialists [facility]  Urgent  Care Non-[facility]  Primary Care  Doctor   Email your healthcare  team securely and privately 24/7 X X X    Manage appointments: schedule your next appointment; view details of past/upcoming appointments X      Request prescription refills. X      View recent personal medical records, including lab and immunizations X X X X   View health record, including health history, allergies, medications X X X X   Read reports about your outpatient visits, procedures, consult and ER notes X X X X   See your discharge summary, which is a recap of your hospital and/or ER visit that includes your diagnosis, lab results, and care plan. X X       How to register for [employer]:  1. Go to  [email or web address].  2. Click on the Sign Up Now box, which takes you to the New Member Sign Up page. You will need to provide the following information:  a. Enter your [employer] Access Code exactly as it appears at the top of this page. (You will not need to use this code after you’ve completed the sign-up process. If you do not sign up before the expiration date, you must request a new code.)   b. Enter your date of birth.   c. Enter your home email address.   d. Click Submit, and follow the next screen’s instructions.  3. Create a [employer] ID. This will be your [employer] login ID and cannot be changed, so think of one that is secure and easy to remember.  4. Create a [employer] password. You can change your password at any time.  5. Enter your Password Reset Question and Answer. This can be used at a later time if you forget your password.   6. Enter your e-mail address. This allows you to receive e-mail notifications when new information is available in [employer].  7. Click Sign Up. You can now view your health information.    For assistance activating your [employer] account, call [phone]
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
## 2017-02-03 ENCOUNTER — TELEPHONE (OUTPATIENT)
Dept: CARDIOLOGY | Facility: MEDICAL CENTER | Age: 67
End: 2017-02-03

## 2017-02-07 ENCOUNTER — OFFICE VISIT (OUTPATIENT)
Dept: CARDIOLOGY | Facility: MEDICAL CENTER | Age: 67
End: 2017-02-07
Payer: COMMERCIAL

## 2017-02-07 VITALS
SYSTOLIC BLOOD PRESSURE: 136 MMHG | WEIGHT: 240 LBS | DIASTOLIC BLOOD PRESSURE: 80 MMHG | BODY MASS INDEX: 34.36 KG/M2 | HEIGHT: 70 IN | OXYGEN SATURATION: 95 % | HEART RATE: 101 BPM

## 2017-02-07 DIAGNOSIS — E11.69 DIABETES MELLITUS TYPE 2 IN OBESE (HCC): ICD-10-CM

## 2017-02-07 DIAGNOSIS — E66.01 MORBID OBESITY, UNSPECIFIED OBESITY TYPE (HCC): ICD-10-CM

## 2017-02-07 DIAGNOSIS — Z86.73 HISTORY OF CVA (CEREBROVASCULAR ACCIDENT): ICD-10-CM

## 2017-02-07 DIAGNOSIS — R94.30 ABNORMAL CARDIOVASCULAR FUNCTION STUDY: ICD-10-CM

## 2017-02-07 DIAGNOSIS — E66.9 DIABETES MELLITUS TYPE 2 IN OBESE (HCC): ICD-10-CM

## 2017-02-07 DIAGNOSIS — I65.23 BILATERAL CAROTID ARTERY STENOSIS: ICD-10-CM

## 2017-02-07 DIAGNOSIS — E78.2 MIXED HYPERLIPIDEMIA: ICD-10-CM

## 2017-02-07 DIAGNOSIS — I10 ESSENTIAL HYPERTENSION: ICD-10-CM

## 2017-02-07 PROBLEM — J44.9 COPD (CHRONIC OBSTRUCTIVE PULMONARY DISEASE) (HCC): Status: ACTIVE | Noted: 2017-02-07

## 2017-02-07 PROBLEM — E78.5 HYPERLIPIDEMIA: Status: ACTIVE | Noted: 2017-02-07

## 2017-02-07 PROCEDURE — 99214 OFFICE O/P EST MOD 30 MIN: CPT | Performed by: NURSE PRACTITIONER

## 2017-02-07 ASSESSMENT — ENCOUNTER SYMPTOMS
COUGH: 0
FEVER: 0
LOSS OF CONSCIOUSNESS: 0
DIZZINESS: 0
SHORTNESS OF BREATH: 1
CHILLS: 0
ABDOMINAL PAIN: 0
PALPITATIONS: 0
BRUISES/BLEEDS EASILY: 0
MYALGIAS: 0
PND: 0
HEADACHES: 0
ORTHOPNEA: 0

## 2017-02-07 NOTE — MR AVS SNAPSHOT
"        Yoan Hermanmac   2017 3:40 PM   Office Visit   MRN: [id number]    Department:  Heart [facility]   Dept Phone:  [phone]    Description:  Male : 1950   Provider:  ANEL Avila           Reason for Visit     Follow-Up HTN      Allergies as of 2017     No Known Allergies      Vital Signs     Blood Pressure Pulse Height Weight Body Mass Index Oxygen Saturation    136/80 mmHg 101 1.778 m (5' 10\") 108.863 kg (240 lb) 34.44 kg/m2 95%    Smoking Status                   Never Smoker            Basic Information     Date Of Birth Sex Race Ethnicity Preferred Language    1950 Male White Non- English      Your appointments     2017  8:15 AM   ECHO with ECHO [facility]   ECHOCARDIOLOGY [facility])    [address]  [place] [address]   [phone]           No prep              Problem List              ICD-10-CM Priority Class Noted - Resolved    Bilateral carotid artery stenosis I65.23 High  2017 - Present    Diabetes mellitus type 2 in obese (CMS-HCC) E11.9, E66.9 Medium  2017 - Present    COPD (chronic obstructive pulmonary disease) (CMS-HCC) J44.9 Medium  2017 - Present    Hypertension I10 High  2017 - Present    Hyperlipidemia E78.5 High  2017 - Present    Obesity E66.9 Medium  2017 - Present    History of CVA (cerebrovascular accident) Z86.73 High  2017 - Present      Health Maintenance        Date Due Completion Dates    IMM DTaP/Tdap/Td Vaccine (1 - Tdap) 1970 ---    COLONOSCOPY 2001 ---    IMM ZOSTER VACCINE 2011 ---    IMM PNEUMOCOCCAL 65+ (ADULT) LOW/MEDIUM RISK SERIES (1 of 2 - PCV13) 2016 ---            Current Immunizations     Influenza Vaccine Adult HD 10/25/2016      Below and/or attached are the medications your provider expects you to take. Review all of your home medications and newly ordered medications with your provider and/or pharmacist. Follow medication instructions as directed by " your provider and/or pharmacist. Please keep your medication list with you and share with your provider. Update the information when medications are discontinued, doses are changed, or new medications (including over-the-counter products) are added; and carry medication information at all times in the event of emergency situations     Allergies:  No Known Allergies          Medications  Valid as of: February 07, 2017 -  4:35 PM    Generic Name Brand Name Tablet Size Instructions for use    ALPRAZolam (Tab) XANAX 0.5 MG Take 0.5 mg by mouth 1 time daily as needed for Sleep.        Aspirin (Tablet Delayed Response) ECOTRIN 81 MG Take 81 mg by mouth every day.        Atorvastatin Calcium (Tab) LIPITOR 40 MG Take 40 mg by mouth every evening.        Cholecalciferol   Take 1 Tab by mouth every day.        HydroCHLOROthiazide (Cap) MICROZIDE 12.5 MG Take 12.5 mg by mouth every day.        Ibuprofen (Tab) MOTRIN 200 MG Take 800 mg by mouth 1 time daily as needed.        Insulin Glargine (Solution Pen-injector) Insulin Glargine 300 UNIT/ML Inject 52 Units as instructed every day.        Insulin Lispro (Solution) HUMALOG 100 UNIT/ML Inject 13-22 Units as instructed 2 Times a Day. 22 units and 13 units in pm        Lisinopril (Tab) PRINIVIL 20 MG Take 20 mg by mouth every day.        Losartan Potassium (Tab) COZAAR 100 MG Take 1 Tab by mouth every day.        Multiple Vitamins-Minerals (Tab) CENTRUM  Take 1 Tab by mouth every day.        .                 Medicines prescribed today were sent to:     [facility][id number] - [place], [place] - [address] [place] [id number]    Phone: [phone] Fax: [phone]    Open 24 Hours?: No      Medication refill instructions:       If your prescription bottle indicates you have medication refills left, it is not necessary to call your provider’s office. Please contact your pharmacy and they will refill your medication.    If your prescription bottle  indicates you do not have any refills left, you may request refills at any time through one of the following ways: The online RACTIV system (except Urgent Care), by calling your provider’s office, or by asking your pharmacy to contact your provider’s office with a refill request. Medication refills are processed only during regular business hours and may not be available until the next business day. Your provider may request additional information or to have a follow-up visit with you prior to refilling your medication.   *Please Note: Medication refills are assigned a new Rx number when refilled electronically. Your pharmacy may indicate that no refills were authorized even though a new prescription for the same medication is available at the pharmacy. Please request the medicine by name with the pharmacy before contacting your provider for a refill.           RACTIV Access Code: [email or web address]  Expires: 2/24/2017 11:28 AM    RACTIV  A secure, online tool to manage your health information     [employer]’s RACTIV® is a secure, online tool that connects you to your personalized health information from the privacy of your home -- day or night - making it very easy for you to manage your healthcare. Once the activation process is completed, you can even access your medical information using the RACTIV payton, which is available for free in the Apple Payton store or Google Play store.     RACTIV provides the following levels of access (as shown below):   My Chart Features   [facility] Doctor [facility]  Urgent  Care [facility]  Primary Care  Doctor   Email your healthcare team securely and privately 24/7 X X X    Manage appointments: schedule your next appointment; view details of past/upcoming appointments X      Request prescription refills. X      View recent personal medical records, including lab and immunizations X X X X   View health record, including health history, allergies,  medications X X X X   Read reports about your outpatient visits, procedures, consult and ER notes X X X X   See your discharge summary, which is a recap of your hospital and/or ER visit that includes your diagnosis, lab results, and care plan. X X       How to register for [employer]:  1. Go to  [email or web address].  2. Click on the Sign Up Now box, which takes you to the New Member Sign Up page. You will need to provide the following information:  a. Enter your [employer]berry Access Code exactly as it appears at the top of this page. (You will not need to use this code after you’ve completed the sign-up process. If you do not sign up before the expiration date, you must request a new code.)   b. Enter your date of birth.   c. Enter your home email address.   d. Click Submit, and follow the next screen’s instructions.  3. Create a Peter [facility]berry ID. This will be your Peter [employer]berry login ID and cannot be changed, so think of one that is secure and easy to remember.  4. Create a [employer]berry password. You can change your password at any time.  5. Enter your Password Reset Question and Answer. This can be used at a later time if you forget your password.   6. Enter your e-mail address. This allows you to receive e-mail notifications when new information is available in Peter [employer]berry.  7. Click Sign Up. You can now view your health information.    For assistance activating your Peter [employer]berry account, call [phone]

## 2017-02-07 NOTE — Clinical Note
[facility]   [address]  [place] [address]  Phone: [phone]  Fax: [phone]              Yoan Ortega  1950    Encounter Date: 2/7/2017    ANEL Avila          PROGRESS NOTE:  Subjective:   Yona Ortega is a 66 y.o. male who presents today for FU of abnormal MPI and multiple cardiac risk factors.    Yoan is a 66 year old male with history of CVA in August 2016 with loss of vision in left eye. Further testing indicated an occluded left carotid artery, and moderate-severe stenosis of the KJ.  He did have a right CEA last month.  He also has hypertension, hyperlipidemia, diabetes and obesity.  He did have an MPI done in December 2016, which showed small area of ischemia and infarction in the anterior wall, LVEF 69% and positive EKG changes.  He is wanting to discuss how to best proceed.    He denies any overt chest pain, but does occasionally have chest tightness and pressure, especially with exertion (activity, house work, yard work); it sometimes radiates to the arm and neck.  He does also have fairly severe arthritis in the neck and shoulders, but he is unsure whether symptoms are due to this.  He does admit to some shortness of breath with exertion, but no orthopnea or PND. No symptomatic palpitations; no dizziness or syncope; no edema.    Past Medical History   Diagnosis Date   • Arthritis    • Diabetes (CMS-HCC)    • Stroke (CMS-HCC) August 2016     Blindness in left eye, partial recovery.   • Hypertension    • Hyperlipidemia    • Carotid stenosis, bilateral January 2017     Status post right CEA, and occluded left carotid   • COPD (chronic obstructive pulmonary disease) (CMS-HCC)    • Abnormal cardiovascular function study December 2016     MPI with small area of mixed ischemia and infarction, LVEF 69%. Positive EKG for ischemia in recovery.     Past Surgical History   Procedure Laterality  Date   • Carotid endarterectomy Right 1/24/2017     Procedure: CAROTID ENDARTERECTOMY;  Surgeon: Dominik Soriano M.D.;  Location: SURGERY [facility];  Service:      History reviewed. No pertinent family history.  History   Smoking status   • Never Smoker    Smokeless tobacco   • Never Used     No Known Allergies  Outpatient Encounter Prescriptions as of 2/7/2017   Medication Sig Dispense Refill   • losartan (COZAAR) 100 MG Tab Take 1 Tab by mouth every day. (Patient taking differently: Take 50 mg by mouth every day.) 90 Tab 3   • Insulin Glargine (TOUJEO SOLOSTAR) 300 UNIT/ML Solution Pen-injector Inject 52 Units as instructed every day.     • insulin lispro (HUMALOG) 100 UNIT/ML Solution Inject 13-22 Units as instructed 2 Times a Day. 22 units and 13 units in pm     • lisinopril (PRINIVIL) 20 MG Tab Take 20 mg by mouth every day.     • alprazolam (XANAX) 0.5 MG Tab Take 0.5 mg by mouth 1 time daily as needed for Sleep.     • hydrochlorothiazide (MICROZIDE) 12.5 MG capsule Take 12.5 mg by mouth every day.     • atorvastatin (LIPITOR) 40 MG Tab Take 40 mg by mouth every evening.     • ibuprofen (MOTRIN) 200 MG Tab Take 800 mg by mouth 1 time daily as needed.     • aspirin EC (ECOTRIN) 81 MG Tablet Delayed Response Take 81 mg by mouth every day.     • Multiple Vitamins-Minerals (CENTRUM) Tab Take 1 Tab by mouth every day.     • Cholecalciferol (VITAMIN D PO) Take 1 Tab by mouth every day.       No facility-administered encounter medications on file as of 2/7/2017.     Review of Systems   Constitutional: Positive for malaise/fatigue. Negative for fever and chills.   Respiratory: Positive for shortness of breath. Negative for cough.    Cardiovascular: Positive for chest pain. Negative for palpitations, orthopnea, leg swelling and PND.   Gastrointestinal: Negative for abdominal pain.   Musculoskeletal: Negative for myalgias.   Skin: Negative for rash.   Neurological: Negative for dizziness, loss of consciousness  "and headaches.   Endo/Heme/Allergies: Does not bruise/bleed easily.        Objective:   /80 mmHg  Pulse 101  Ht 1.778 m (5' 10\")  Wt 108.863 kg (240 lb)  BMI 34.44 kg/m2  SpO2 95%    Physical Exam   Constitutional: He is oriented to person, place, and time. He appears well-developed and well-nourished. No distress.   He is overweight (BMI 34.44)   HENT:   Head: Normocephalic.   Eyes: Conjunctivae and EOM are normal.   Neck: Normal range of motion. Neck supple. No JVD present.   Right CEA incision, healing well.   Cardiovascular: Normal rate, regular rhythm, normal heart sounds and intact distal pulses.  Exam reveals no gallop and no friction rub.    No murmur heard.  Pulmonary/Chest: Effort normal and breath sounds normal.   Abdominal: Soft. Bowel sounds are normal.   Musculoskeletal: Normal range of motion. He exhibits no edema.   Neurological: He is alert and oriented to person, place, and time.   Skin: Skin is warm and dry.   Psychiatric: He has a normal mood and affect. His behavior is normal.     NUCLEAR IMAGING INTERPRETATION OF MPI OF 12/28/2016 - REVIEWED WITH PATIENT:   Normal left ventricular size, ejection fraction, and wall motion.   Tiny area of mixed anterior ischemia and infarction.    ECG INTERPRETATION   Positive stress ECG for ischemia in recovery.    Lab Results   Component Value Date/Time    CHOLESTEROL, 11/07/2015 08:18 AM    LDL 83 11/07/2015 08:18 AM    HDL 37* 11/07/2015 08:18 AM    TRIGLYCERIDES 93 11/07/2015 08:18 AM       Lab Results   Component Value Date/Time    SODIUM 138 01/23/2017 05:20 PM    POTASSIUM 4.2 01/23/2017 05:20 PM    CHLORIDE 101 01/23/2017 05:20 PM    CO2 28 01/23/2017 05:20 PM    GLUCOSE 139* 01/23/2017 05:20 PM    BUN 19 01/23/2017 05:20 PM    CREATININE 1.33 01/23/2017 05:20 PM     Lab Results   Component Value Date/Time    ALKALINE PHOSPHATASE 88 11/21/2016 10:51 AM    AST(SGOT) 34 11/21/2016 10:51 AM    ALT(SGPT) 59* 11/21/2016 10:51 AM    TOTAL " BILIRUBIN 0.6 11/21/2016 10:51 AM          Assessment:     1. History of CVA (cerebrovascular accident)     2. Bilateral carotid artery stenosis     3. Abnormal cardiovascular function study     4. Essential hypertension     5. Mixed hyperlipidemia     6. Diabetes mellitus type 2 in obese (CMS-HCC)     7. Morbid obesity, unspecified obesity type (CMS-HCC)         Medical Decision Making:  Today's Assessment / Status / Plan:     1. History of CVA in August 2016, with occluded LICA, and recent right CEA in January 2017.    2. MPI with small area of ischemia/infarction and EKG changes, with multiple risk factors, and symptoms of chest tightness and shortness of breath.  Discussed proceedings with echocardiogram versus angiogram, and we will proceed with angiogram.  Will get this scheduled.    3. Hypertension, treated and currently stable.    4. Hyperlipidemia, treated.  LDL is 83.    5. Diabetes mellitus, treated, with suboptimal control. Recent HgbA1c was 9.4.    6. Morbid obesity, discussed lifestyle modifications including weight loss and exercise, but will proceed with angiogram first.    As above, to scheduled angiogram for now. Same medications for now. We will determine FU based on results of angiogram.    Collaborating MD: Wally      No Recipients

## 2017-02-08 NOTE — PROGRESS NOTES
Subjective:   Yoan Ortega is a 66 y.o. male who presents today for FU of abnormal MPI and multiple cardiac risk factors.    Yoan is a 66 year old male with history of CVA in August 2016 with loss of vision in left eye. Further testing indicated an occluded left carotid artery, and moderate-severe stenosis of the KJ.  He did have a right CEA last month.  He also has hypertension, hyperlipidemia, diabetes and obesity.  He did have an MPI done in December 2016, which showed small area of ischemia and infarction in the anterior wall, LVEF 69% and positive EKG changes.  He is wanting to discuss how to best proceed.    He denies any overt chest pain, but does occasionally have chest tightness and pressure, especially with exertion (activity, house work, yard work); it sometimes radiates to the arm and neck.  He does also have fairly severe arthritis in the neck and shoulders, but he is unsure whether symptoms are due to this.  He does admit to some shortness of breath with exertion, but no orthopnea or PND. No symptomatic palpitations; no dizziness or syncope; no edema.    Past Medical History   Diagnosis Date   • Arthritis    • Diabetes (CMS-HCC)    • Stroke (CMS-HCC) August 2016     Blindness in left eye, partial recovery.   • Hypertension    • Hyperlipidemia    • Carotid stenosis, bilateral January 2017     Status post right CEA, and occluded left carotid   • COPD (chronic obstructive pulmonary disease) (CMS-HCC)    • Abnormal cardiovascular function study December 2016     MPI with small area of mixed ischemia and infarction, LVEF 69%. Positive EKG for ischemia in recovery.     Past Surgical History   Procedure Laterality Date   • Carotid endarterectomy Right 1/24/2017     Procedure: CAROTID ENDARTERECTOMY;  Surgeon: Dominik Soriano M.D.;  Location: SURGERY [facility];  Service:      History reviewed. No pertinent family history.  History   Smoking status   • Never Smoker    Smokeless tobacco  "  • Never Used     No Known Allergies  Outpatient Encounter Prescriptions as of 2/7/2017   Medication Sig Dispense Refill   • losartan (COZAAR) 100 MG Tab Take 1 Tab by mouth every day. (Patient taking differently: Take 50 mg by mouth every day.) 90 Tab 3   • Insulin Glargine (TOUJEO SOLOSTAR) 300 UNIT/ML Solution Pen-injector Inject 52 Units as instructed every day.     • insulin lispro (HUMALOG) 100 UNIT/ML Solution Inject 13-22 Units as instructed 2 Times a Day. 22 units and 13 units in pm     • lisinopril (PRINIVIL) 20 MG Tab Take 20 mg by mouth every day.     • alprazolam (XANAX) 0.5 MG Tab Take 0.5 mg by mouth 1 time daily as needed for Sleep.     • hydrochlorothiazide (MICROZIDE) 12.5 MG capsule Take 12.5 mg by mouth every day.     • atorvastatin (LIPITOR) 40 MG Tab Take 40 mg by mouth every evening.     • ibuprofen (MOTRIN) 200 MG Tab Take 800 mg by mouth 1 time daily as needed.     • aspirin EC (ECOTRIN) 81 MG Tablet Delayed Response Take 81 mg by mouth every day.     • Multiple Vitamins-Minerals (CENTRUM) Tab Take 1 Tab by mouth every day.     • Cholecalciferol (VITAMIN D PO) Take 1 Tab by mouth every day.       No facility-administered encounter medications on file as of 2/7/2017.     Review of Systems   Constitutional: Positive for malaise/fatigue. Negative for fever and chills.   Respiratory: Positive for shortness of breath. Negative for cough.    Cardiovascular: Positive for chest pain. Negative for palpitations, orthopnea, leg swelling and PND.   Gastrointestinal: Negative for abdominal pain.   Musculoskeletal: Negative for myalgias.   Skin: Negative for rash.   Neurological: Negative for dizziness, loss of consciousness and headaches.   Endo/Heme/Allergies: Does not bruise/bleed easily.        Objective:   /80 mmHg  Pulse 101  Ht 1.778 m (5' 10\")  Wt 108.863 kg (240 lb)  BMI 34.44 kg/m2  SpO2 95%    Physical Exam   Constitutional: He is oriented to person, place, and time. He appears " well-developed and well-nourished. No distress.   He is overweight (BMI 34.44)   HENT:   Head: Normocephalic.   Eyes: Conjunctivae and EOM are normal.   Neck: Normal range of motion. Neck supple. No JVD present.   Right CEA incision, healing well.   Cardiovascular: Normal rate, regular rhythm, normal heart sounds and intact distal pulses.  Exam reveals no gallop and no friction rub.    No murmur heard.  Pulmonary/Chest: Effort normal and breath sounds normal.   Abdominal: Soft. Bowel sounds are normal.   Musculoskeletal: Normal range of motion. He exhibits no edema.   Neurological: He is alert and oriented to person, place, and time.   Skin: Skin is warm and dry.   Psychiatric: He has a normal mood and affect. His behavior is normal.     NUCLEAR IMAGING INTERPRETATION OF MPI OF 12/28/2016 - REVIEWED WITH PATIENT:   Normal left ventricular size, ejection fraction, and wall motion.   Tiny area of mixed anterior ischemia and infarction.    ECG INTERPRETATION   Positive stress ECG for ischemia in recovery.    Lab Results   Component Value Date/Time    CHOLESTEROL, 11/07/2015 08:18 AM    LDL 83 11/07/2015 08:18 AM    HDL 37* 11/07/2015 08:18 AM    TRIGLYCERIDES 93 11/07/2015 08:18 AM       Lab Results   Component Value Date/Time    SODIUM 138 01/23/2017 05:20 PM    POTASSIUM 4.2 01/23/2017 05:20 PM    CHLORIDE 101 01/23/2017 05:20 PM    CO2 28 01/23/2017 05:20 PM    GLUCOSE 139* 01/23/2017 05:20 PM    BUN 19 01/23/2017 05:20 PM    CREATININE 1.33 01/23/2017 05:20 PM     Lab Results   Component Value Date/Time    ALKALINE PHOSPHATASE 88 11/21/2016 10:51 AM    AST(SGOT) 34 11/21/2016 10:51 AM    ALT(SGPT) 59* 11/21/2016 10:51 AM    TOTAL BILIRUBIN 0.6 11/21/2016 10:51 AM          Assessment:     1. History of CVA (cerebrovascular accident)     2. Bilateral carotid artery stenosis     3. Abnormal cardiovascular function study     4. Essential hypertension     5. Mixed hyperlipidemia     6. Diabetes mellitus type 2 in  obese (CMS-HCC)     7. Morbid obesity, unspecified obesity type (CMS-HCC)         Medical Decision Making:  Today's Assessment / Status / Plan:     1. History of CVA in August 2016, with occluded LICA, and recent right CEA in January 2017.    2. MPI with small area of ischemia/infarction and EKG changes, with multiple risk factors, and symptoms of chest tightness and shortness of breath.  Discussed proceedings with echocardiogram versus angiogram, and we will proceed with angiogram.  Will get this scheduled.    3. Hypertension, treated and currently stable.    4. Hyperlipidemia, treated.  LDL is 83.    5. Diabetes mellitus, treated, with suboptimal control. Recent HgbA1c was 9.4.    6. Morbid obesity, discussed lifestyle modifications including weight loss and exercise, but will proceed with angiogram first.    As above, to scheduled angiogram for now. Same medications for now. We will determine FU based on results of angiogram.    Collaborating MD: Wally

## 2017-02-09 ENCOUNTER — TELEPHONE (OUTPATIENT)
Dept: CARDIOLOGY | Facility: MEDICAL CENTER | Age: 67
End: 2017-02-09

## 2017-02-09 NOTE — TELEPHONE ENCOUNTER
----- Message from ANEL Avila sent at 2/8/2017  3:36 PM PST -----  Regarding: RE: Angiogram please  Ly Stokes is faxing the orders to you now. It's OK to scheduled with another MD if necessary.    Thanks!  AB  ----- Message -----     From: Ly Ward     Sent: 2/8/2017   8:52 AM       To: ANEL Avila  Subject: RE: Angiogram please                             I wanted to let you know that I don't have anything for  until the 24th. I can't add anymore op angio's for this week due to one of the cath labs is down. Is it ok if the patient waits until the 24th or do you want me to schedule with one of the other DrCristians?  ----- Message -----     From: ANEL Avila     Sent: 2/7/2017   4:47 PM       To: Ly Ward  Subject: Angiogram please                                 Ly,    Can you please call this patient to scheduled angiogram?  If possible, with Dr. Jaimes?    Dx: Anginal symptoms, borderline abnormal MPI, recent right CEA (carotid stenosis)    Thank you!  AB

## 2017-02-09 NOTE — TELEPHONE ENCOUNTER
Patient is scheduled on 2-14-17 for a C w/poss with Dr. Ness at [facility]. Patient was told to cut insulin in half night before and hold am of procedure. Patient was told to check in at 6:00am for a 7:30 procedure.

## 2017-02-10 ENCOUNTER — TELEPHONE (OUTPATIENT)
Dept: CARDIOLOGY | Facility: MEDICAL CENTER | Age: 67
End: 2017-02-10

## 2017-02-10 NOTE — TELEPHONE ENCOUNTER
----- Message from Bonnie De La Garza sent at 2/10/2017  9:57 AM PST -----  Regarding: angiogram questions  Contact: [phone]  AB/elmira    Pt's wife Twila calling to ask some questions about the angiogram AB is ordering, Twila also is expressing deep dissatisfaction about the pt's visit with EC on 2/2.      Please call Twila at [phone].

## 2017-02-10 NOTE — TELEPHONE ENCOUNTER
Reviewed BOTH recent OV discussions with Twila (who didn't accompany pt. To [facility] with Tierra). Twila had valid questions about Dr. Díaz's recommendations for echocardiogram versus   Tierra's recommendations for angiogram. Explained Tierra's reasoning for ordering angiogram to Twila (pt. Has hx. Of plaque in cerebral vessels, multiple risk factors, sx. Of chest pain and dyspnea). Twila agrees that pt. Should proceed with angiogram.

## 2017-02-13 DIAGNOSIS — Z01.810 PRE-OPERATIVE CARDIOVASCULAR EXAMINATION: ICD-10-CM

## 2017-02-13 DIAGNOSIS — Z01.812 PRE-PROCEDURAL LABORATORY EXAMINATION: ICD-10-CM

## 2017-02-13 LAB
ANION GAP SERPL CALC-SCNC: 10 MMOL/L (ref 0–11.9)
BUN SERPL-MCNC: 22 MG/DL (ref 8–22)
CALCIUM SERPL-MCNC: 9.9 MG/DL (ref 8.5–10.5)
CHLORIDE SERPL-SCNC: 101 MMOL/L (ref 96–112)
CO2 SERPL-SCNC: 28 MMOL/L (ref 20–33)
CREAT SERPL-MCNC: 1.24 MG/DL (ref 0.5–1.4)
ERYTHROCYTE [DISTWIDTH] IN BLOOD BY AUTOMATED COUNT: 42.3 FL (ref 35.9–50)
GFR SERPL CREATININE-BSD FRML MDRD: 58 ML/MIN/1.73 M 2
GLUCOSE SERPL-MCNC: 102 MG/DL (ref 65–99)
HCT VFR BLD AUTO: 45.3 % (ref 42–52)
HGB BLD-MCNC: 15.6 G/DL (ref 14–18)
INR PPP: 1.01 (ref 0.87–1.13)
MCH RBC QN AUTO: 29.2 PG (ref 27–33)
MCHC RBC AUTO-ENTMCNC: 34.4 G/DL (ref 33.7–35.3)
MCV RBC AUTO: 84.7 FL (ref 81.4–97.8)
PLATELET # BLD AUTO: 179 K/UL (ref 164–446)
PMV BLD AUTO: 10.2 FL (ref 9–12.9)
POTASSIUM SERPL-SCNC: 3.8 MMOL/L (ref 3.6–5.5)
PROTHROMBIN TIME: 13.6 SEC (ref 12–14.6)
RBC # BLD AUTO: 5.35 M/UL (ref 4.7–6.1)
SODIUM SERPL-SCNC: 139 MMOL/L (ref 135–145)
WBC # BLD AUTO: 8.4 K/UL (ref 4.8–10.8)

## 2017-02-13 PROCEDURE — 36415 COLL VENOUS BLD VENIPUNCTURE: CPT

## 2017-02-13 PROCEDURE — 85027 COMPLETE CBC AUTOMATED: CPT

## 2017-02-13 PROCEDURE — 80048 BASIC METABOLIC PNL TOTAL CA: CPT

## 2017-02-13 PROCEDURE — 85610 PROTHROMBIN TIME: CPT

## 2017-02-13 RX ORDER — LOSARTAN POTASSIUM 50 MG/1
100 TABLET ORAL DAILY
COMMUNITY
End: 2018-02-15 | Stop reason: SDUPTHER

## 2017-02-13 RX ORDER — OMEPRAZOLE 20 MG/1
20 CAPSULE, DELAYED RELEASE ORAL PRN
COMMUNITY

## 2017-02-14 ENCOUNTER — HOSPITAL ENCOUNTER (OUTPATIENT)
Facility: MEDICAL CENTER | Age: 67
End: 2017-02-14
Attending: INTERNAL MEDICINE | Admitting: INTERNAL MEDICINE
Payer: COMMERCIAL

## 2017-02-14 VITALS
OXYGEN SATURATION: 95 % | TEMPERATURE: 98 F | WEIGHT: 237.88 LBS | SYSTOLIC BLOOD PRESSURE: 137 MMHG | HEIGHT: 70 IN | RESPIRATION RATE: 16 BRPM | BODY MASS INDEX: 34.06 KG/M2 | DIASTOLIC BLOOD PRESSURE: 85 MMHG | HEART RATE: 87 BPM

## 2017-02-14 LAB
ACT BLD: 271 SEC (ref 74–137)
EKG IMPRESSION: NORMAL
GLUCOSE BLD-MCNC: 135 MG/DL (ref 65–99)

## 2017-02-14 PROCEDURE — 360979 HCHG DIAGNOSTIC CATH

## 2017-02-14 PROCEDURE — C1769 GUIDE WIRE: HCPCS

## 2017-02-14 PROCEDURE — 82962 GLUCOSE BLOOD TEST: CPT

## 2017-02-14 PROCEDURE — 85347 COAGULATION TIME ACTIVATED: CPT

## 2017-02-14 PROCEDURE — C1887 CATHETER, GUIDING: HCPCS

## 2017-02-14 PROCEDURE — 99153 MOD SED SAME PHYS/QHP EA: CPT

## 2017-02-14 PROCEDURE — 93571 IV DOP VEL&/PRESS C FLO 1ST: CPT

## 2017-02-14 PROCEDURE — C1894 INTRO/SHEATH, NON-LASER: HCPCS

## 2017-02-14 PROCEDURE — 307093 HCHG TR BAND RADIAL

## 2017-02-14 PROCEDURE — 99152 MOD SED SAME PHYS/QHP 5/>YRS: CPT

## 2017-02-14 PROCEDURE — 700111 HCHG RX REV CODE 636 W/ 250 OVERRIDE (IP)

## 2017-02-14 PROCEDURE — 700101 HCHG RX REV CODE 250

## 2017-02-14 PROCEDURE — 93458 L HRT ARTERY/VENTRICLE ANGIO: CPT

## 2017-02-14 PROCEDURE — 304952 HCHG R 2 PADS

## 2017-02-14 RX ORDER — MIDAZOLAM HYDROCHLORIDE 1 MG/ML
INJECTION INTRAMUSCULAR; INTRAVENOUS
Status: COMPLETED
Start: 2017-02-14 | End: 2017-02-14

## 2017-02-14 RX ORDER — METOPROLOL SUCCINATE 50 MG/1
50 TABLET, EXTENDED RELEASE ORAL DAILY
Qty: 30 TAB | Refills: 6
Start: 2017-02-14 | End: 2018-03-06 | Stop reason: SDUPTHER

## 2017-02-14 RX ORDER — ACETAMINOPHEN 325 MG/1
325 TABLET ORAL EVERY 4 HOURS PRN
Status: DISCONTINUED | OUTPATIENT
Start: 2017-02-14 | End: 2017-02-14 | Stop reason: HOSPADM

## 2017-02-14 RX ORDER — SODIUM CHLORIDE 9 MG/ML
INJECTION, SOLUTION INTRAVENOUS CONTINUOUS
Status: DISCONTINUED | OUTPATIENT
Start: 2017-02-14 | End: 2017-02-14 | Stop reason: HOSPADM

## 2017-02-14 RX ORDER — LIDOCAINE HYDROCHLORIDE 20 MG/ML
INJECTION, SOLUTION INFILTRATION; PERINEURAL
Status: COMPLETED
Start: 2017-02-14 | End: 2017-02-14

## 2017-02-14 RX ORDER — HEPARIN SODIUM,PORCINE 1000/ML
VIAL (ML) INJECTION
Status: COMPLETED
Start: 2017-02-14 | End: 2017-02-14

## 2017-02-14 RX ORDER — ADENOSINE 3 MG/ML
INJECTION, SOLUTION INTRAVENOUS
Status: COMPLETED
Start: 2017-02-14 | End: 2017-02-14

## 2017-02-14 RX ORDER — VERAPAMIL HYDROCHLORIDE 2.5 MG/ML
INJECTION, SOLUTION INTRAVENOUS
Status: COMPLETED
Start: 2017-02-14 | End: 2017-02-14

## 2017-02-14 RX ORDER — ATORVASTATIN CALCIUM 80 MG/1
80 TABLET, FILM COATED ORAL DAILY
Qty: 30 TAB | Refills: 1
Start: 2017-02-14 | End: 2018-03-06 | Stop reason: SDUPTHER

## 2017-02-14 RX ADMIN — LIDOCAINE HYDROCHLORIDE: 20 INJECTION, SOLUTION INFILTRATION; PERINEURAL at 07:42

## 2017-02-14 RX ADMIN — FENTANYL CITRATE 50 MCG: 50 INJECTION, SOLUTION INTRAMUSCULAR; INTRAVENOUS at 07:30

## 2017-02-14 RX ADMIN — HEPARIN SODIUM: 1000 INJECTION, SOLUTION INTRAVENOUS; SUBCUTANEOUS at 07:42

## 2017-02-14 RX ADMIN — VERAPAMIL HYDROCHLORIDE 5 MG: 2.5 INJECTION, SOLUTION INTRAVENOUS at 07:42

## 2017-02-14 RX ADMIN — MIDAZOLAM 2 MG: 1 INJECTION INTRAMUSCULAR; INTRAVENOUS at 08:11

## 2017-02-14 RX ADMIN — ADENOSINE 90 MG: 3 INJECTION, SOLUTION INTRAVENOUS at 08:29

## 2017-02-14 RX ADMIN — MIDAZOLAM 2 MG: 1 INJECTION INTRAMUSCULAR; INTRAVENOUS at 07:45

## 2017-02-14 RX ADMIN — HEPARIN SODIUM 2000 UNITS: 200 INJECTION, SOLUTION INTRAVENOUS at 07:45

## 2017-02-14 RX ADMIN — NITROGLYCERIN 10 ML: 20 INJECTION INTRAVENOUS at 07:42

## 2017-02-14 ASSESSMENT — PAIN SCALES - GENERAL
PAINLEVEL_OUTOF10: 0
PAINLEVEL_OUTOF10: 0

## 2017-02-14 NOTE — PROCEDURES
DATE OF SERVICE:  02/14/2017    PROCEDURES:  1.  Selective coronary angiography.  2.  Left heart catheterization.  3.  Left ventriculography.  4.  Fractional flow reserve of LAD.    INDICATIONS:  Patient is a 66-year-old diabetic with a history of   cerebrovascular disease, diabetes, and hyperlipidemia.  He had a mildly   abnormal myocardial perfusion scan.  He is undergoing angiography to determine   the extent of his coronary artery disease.    DESCRIPTION OF PROCEDURE:  The right wrist was sterilely prepped and draped in   the usual fashion and an Black's test was performed.  The patient was sedated   initially with 1.5 mg of Versed and 50 mcg of fentanyl.  He received   additional Versed during the procedure for conscious sedation.    The area of the right radial artery was anesthetized with 2% lidocaine.  The   artery was easily entered.  Patient was then given 6000 units of intravenous   heparin, 2.5 mg of intra-arterial verapamil and 100 mcg of intra-arterial   nitroglycerin.  A 5-Urdu Harry catheter was placed and advanced into the   ostium of the right coronary artery where selective angiograms were performed.    This catheter was then advanced into the ostium of the LAD where selective   angiograms were performed.    Next, a pigtail catheter was exchanged and a left heart catheterization was   performed.  This was followed by left ventriculogram using 30 mL of contrast   and then a left heart pullback.  Following this, an ACT was checked.  Patient   was given additional heparin and a fractional flow reserve was performed.  An   EBU 3.5 guide was placed and a fractional flow wire was placed down the LAD.    The wire was normalized proximally and then placed across the lesion and the   patient was given adenosine.  FFR after 2 minutes of adenosine was 0.81, which   was just above the level of significance; therefore, the lesion was not   intervened upon.  The flow wire and guiding catheter removed.  An  additional   100 mcg of arterial nitroglycerin was given through the sheath.  The sheath   was then removed and hemostasis was obtained by direct compression of the   artery.  There were no complications.    ESTIMATED BLOOD LOSS:  15 mL.    FLUOROSCOPY TIME:  12.4 minutes.    X-RAY DOSE-AREA PRODUCT:  11,688.    TOTAL CONTRAST MEDIA:  196 mL of Omnipaque 350.    HEMODYNAMICS:  Revealed sinus rhythm throughout the procedure, ejection   fraction of 66%, aortic pressure 129/80 mmHg.  Left ventricular pressure was   162/8 mmHg.  LV pullback does not demonstrate a gradient.    Fluoroscopy of the heart is unremarkable.    The left main is free of disease and bifurcates into the LAD and circumflex   systems.  The LAD is a large caliber vessel between 3.5 and 4 mm in diameter   and supplies the apex and inferoapical segment.  The LAD gives rise to a large   caliber first diagonal artery, a small second diagonal artery and a large   third diagonal artery.  In the mid LAD and just distal to the third diagonal   artery is an elongated stenosis of 60%.  Beyond this lesion, the LAD is   unremarkable.  Fractional flow reserve with a wire placed well distally beyond   the lesion revealed an FFR of 0.81.    The circumflex is also a large caliber vessel, approximately 3.5 mm in   diameter and gives rise to a large obtuse marginal artery.  Beyond this, the   circumflex is a small vessel giving rise to a small atrial branch and   terminating in the AV groove.    The right coronary artery is a dominant vessel and of large caliber.  This   vessel gives rise to a large atrial branch.  The small RV free wall branch   arises in the proximal segment and a large RV free wall branch arises just   proximal to the acute margin.  Distally, the right coronary artery gives rise   to a large caliber PDA and large bifurcating posterior left ventricular   artery.  The right coronary artery is free of disease.    The left ventriculogram demonstrates  normal segmental wall motion.  There is   no mitral insufficiency and the aortic root is unremarkable.  Ejection   fraction of 66%.    IMPRESSION:  1.  Coronary artery disease with 60% elongated lesion in the mid LAD just   distal to the origin of the third diagonal artery.  Fractional flow reserve   0.81 with wire placed well distally beyond the lesion.  2.  Normal left ventricular end-diastolic pressure.  3.  Normal left ventricular systolic function, ejection fraction of 66%.  4.  No other coronary artery disease.       ____________________________________     MD TIFFANIE CLAROS / NTS    DD:  02/14/2017 08:53:53  DT:  02/14/2017 09:41:01    D#:  [id number]  Job#:  [id number]

## 2017-02-14 NOTE — DISCHARGE INSTRUCTIONS
ACTIVITY: Rest and take it easy for the first 24 hours.  A responsible adult is recommended to remain with you during that time.  It is normal to feel sleepy.  We encourage you to not do anything that requires balance, judgment or coordination.    MILD FLU-LIKE SYMPTOMS ARE NORMAL. YOU MAY EXPERIENCE GENERALIZED MUSCLE ACHES, THROAT IRRITATION, HEADACHE AND/OR SOME NAUSEA.    FOR 24 HOURS DO NOT:  Drive, operate machinery or run household appliances.  Drink beer or alcoholic beverages.   Make important decisions or sign legal documents.    SPECIAL INSTRUCTIONS:     DIET: To avoid nausea, slowly advance diet as tolerated, avoiding spicy or greasy foods for the first day.  Add more substantial food to your diet according to your physician's instructions.  Babies can be fed formula or breast milk as soon as they are hungry.  INCREASE FLUIDS AND FIBER TO AVOID CONSTIPATION.    SURGICAL DRESSING/BATHING: May remove dressing in 24 hours. May shower in 24 hours. Do not submerge in water for 7 days.     FOLLOW-UP APPOINTMENT:  A follow-up appointment should be arranged with your doctor; call to schedule.    You should CALL YOUR PHYSICIAN if you develop:  Fever greater than 101 degrees F.  Pain not relieved by medication, or persistent nausea or vomiting.  Excessive bleeding (blood soaking through dressing) or unexpected drainage from the wound.  Extreme redness or swelling around the incision site, drainage of pus or foul smelling drainage.  Inability to urinate or empty your bladder within 8 hours.  Problems with breathing or chest pain.    You should call 911 if you develop problems with breathing or chest pain.  If you are unable to contact your doctor or surgical center, you should go to the nearest emergency room or urgent care center.  Physician's telephone #: Dr. Ness [phone]    If any questions arise, call your doctor.  If your doctor is not available, please feel free to call the Surgical Center at  [phone].  The Center is open Monday through Friday from 7AM to 7PM.  You can also call the HEALTH HOTLINE open 24 hours/day, 7 days/week and speak to a nurse at [phone], or toll free at [phone].    A registered nurse may call you a few days after your surgery to see how you are doing after your procedure.    MEDICATIONS: Resume taking daily medication.  Take prescribed pain medication with food.  If no medication is prescribed, you may take non-aspirin pain medication if needed.  PAIN MEDICATION CAN BE VERY CONSTIPATING.  Take a stool softener or laxative such as senokot, pericolace, or milk of magnesia if needed.    If your physician has prescribed pain medication that includes Acetaminophen (Tylenol), do not take additional Acetaminophen (Tylenol) while taking the prescribed medication.    Depression / Suicide Risk    As you are discharged from this [facility] facility, it is important to learn how to keep safe from harming yourself.    Recognize the warning signs:  · Abrupt changes in personality, positive or negative- including increase in energy   · Giving away possessions  · Change in eating patterns- significant weight changes-  positive or negative  · Change in sleeping patterns- unable to sleep or sleeping all the time   · Unwillingness or inability to communicate  · Depression  · Unusual sadness, discouragement and loneliness  · Talk of wanting to die  · Neglect of personal appearance   · Rebelliousness- reckless behavior  · Withdrawal from people/activities they love  · Confusion- inability to concentrate     If you or a loved one observes any of these behaviors or has concerns about self-harm, here's what you can do:  · Talk about it- your feelings and reasons for harming yourself  · Remove any means that you might use to hurt yourself (examples: pills, rope, extension cords, firearm)  · Get professional help from the community (Mental Health, Substance Abuse, psychological  counseling)  · Do not be alone:Call your Safe Contact- someone whom you trust who will be there for you.  · Call your local CRISIS HOTLINE 068-1244 or 537-835-6654  · Call your local Children's Mobile Crisis Response Team [place] (422) 875-4482 or www.[employer]  · Call the toll free National Suicide Prevention Hotlines   · National Suicide Prevention Lifeline 565-691-KENW (6531)  [employer] [facility] 800-SUICIDE (615-6525)Radial Catherization Discharge Instructions      · Do not subject hand/arm to any forceful movements for 24 hours    i.e. supporting weight when rising from the chair or bed.   · Do not drive a car for 24 hours  · You may remove the dressing tomorrow  · You may shower on the day following your procedure.  Do not take a tub bath or submerge the puncture site in water for 3 days following the procedure.  · No Lifting more than 3-5 pounds with affected wrist for 5 days  · Follow up with Dr. Ness   2-4 weeks.  · Increase fluids for 2 days post procedure.  · Continue all previous medications unless otherwise instructed.    If bleeding should occur following discharge:  · Sit down and apply firm pressure to site with your fingers for 10 minutes  · If the bleeding stops, continue to sit quietly, keeping your wrist straight for 2 hours.  Notify physician as soon as possible ( [phone])  · If bleeding does not stop after 10 minutes, or if there is a large amount of bleeding or spurting, call 911 immediately.  Do not drive yourself to the hospital.

## 2017-02-14 NOTE — IP AVS SNAPSHOT
2/14/2017          Yoan Dacosta [place]  [address]  [place] [id number]    Dear Yoan:    [facility] wants to ensure your discharge home is safe and you or your loved ones have had all your questions answered regarding your care after you leave the hospital.    You may receive a telephone call within two days of your discharge.  This call is to make certain you understand your discharge instructions as well as ensure we provided you with the best care possible during your stay with us.     The call will only last approximately 3-5 minutes and will be done by a nurse.    Once again, we want to ensure your discharge home is safe and that you have a clear understanding of any next steps in your care.  If you have any questions or concerns, please do not hesitate to contact us, we are here for you.  Thank you for choosing [facility] for your healthcare needs.    Sincerely,    Zafar Tate    [facility]

## 2017-02-14 NOTE — CATH LAB
Immediate Post-Operative Note      PreOp Diagnosis: Abnormal MPI     PostOp Diagnosis:  Same and CAD    Procedure(s) :  Coronary Angiography, Left Heart Catheterization, Left Ventriculography.  FFR of LAD. R radial approach    Surgeon(s):  Jose Ness M.D.    Type of Anesthesia: Moderate Sedation    Specimen: None    Estimated Blood Loss: 15 cc's    Contrast Media:  156 cc's    Fluoro Time: 12.4 min    Xray DAP: [id number]    Findings: elongated 60% lesion in mid LAD just distal to origin of large CLIF. FFR .81. No other lesions. EF 66% LVEDP <10.    Complications: none      Jose Ness M.D.  2/14/2017 8:29 AM

## 2017-02-14 NOTE — IP AVS SNAPSHOT
" Home Care Instructions                                                                                                                Name:Yoan Ortega  Medical Record Number:[id number]  CSN: [id number]    YOB: 1950   Age: 66 y.o.  Sex: male  HT:1.778 m (5' 10\") WT: 107.9 kg (237 lb 14 oz)          Admit Date: 2/14/2017     Discharge Date:   Today's Date: 2/14/2017  Attending Doctor:  Jose Ness M.D.                  Allergies:  Review of patient's allergies indicates no known allergies.              Follow-up Information     1. Follow up with Jose Ness M.D. In 1 week.    Specialty:  Interventional Cardiology    Contact information    [address]  [address]  [place] [address] [phone]          Discharge Instructions         ACTIVITY: Rest and take it easy for the first 24 hours.  A responsible adult is recommended to remain with you during that time.  It is normal to feel sleepy.  We encourage you to not do anything that requires balance, judgment or coordination.    MILD FLU-LIKE SYMPTOMS ARE NORMAL. YOU MAY EXPERIENCE GENERALIZED MUSCLE ACHES, THROAT IRRITATION, HEADACHE AND/OR SOME NAUSEA.    FOR 24 HOURS DO NOT:  Drive, operate machinery or run household appliances.  Drink beer or alcoholic beverages.   Make important decisions or sign legal documents.    SPECIAL INSTRUCTIONS:     DIET: To avoid nausea, slowly advance diet as tolerated, avoiding spicy or greasy foods for the first day.  Add more substantial food to your diet according to your physician's instructions.  Babies can be fed formula or breast milk as soon as they are hungry.  INCREASE FLUIDS AND FIBER TO AVOID CONSTIPATION.    SURGICAL DRESSING/BATHING: May remove dressing in 24 hours. May shower in 24 hours. Do not submerge in water for 7 days.     FOLLOW-UP APPOINTMENT:  A follow-up appointment should be arranged with your doctor; call to schedule.    You should CALL YOUR PHYSICIAN if you develop:  Fever " greater than 101 degrees F.  Pain not relieved by medication, or persistent nausea or vomiting.  Excessive bleeding (blood soaking through dressing) or unexpected drainage from the wound.  Extreme redness or swelling around the incision site, drainage of pus or foul smelling drainage.  Inability to urinate or empty your bladder within 8 hours.  Problems with breathing or chest pain.    You should call 911 if you develop problems with breathing or chest pain.  If you are unable to contact your doctor or surgical center, you should go to the nearest emergency room or urgent care center.  Physician's telephone #: Dr. Ness [phone]    If any questions arise, call your doctor.  If your doctor is not available, please feel free to call the Surgical Center at [phone].  The Center is open Monday through Friday from 7AM to 7PM.  You can also call the [facility] HOTLINE open 24 hours/day, 7 days/week and speak to a nurse at [phone], or toll free at [phone].    A registered nurse may call you a few days after your surgery to see how you are doing after your procedure.    MEDICATIONS: Resume taking daily medication.  Take prescribed pain medication with food.  If no medication is prescribed, you may take non-aspirin pain medication if needed.  PAIN MEDICATION CAN BE VERY CONSTIPATING.  Take a stool softener or laxative such as senokot, pericolace, or milk of magnesia if needed.    If your physician has prescribed pain medication that includes Acetaminophen (Tylenol), do not take additional Acetaminophen (Tylenol) while taking the prescribed medication.    Depression / Suicide Risk    As you are discharged from this [facility] facility, it is important to learn how to keep safe from harming yourself.    Recognize the warning signs:  · Abrupt changes in personality, positive or negative- including increase in energy   · Giving away possessions  · Change in eating patterns- significant weight changes-   positive or negative  · Change in sleeping patterns- unable to sleep or sleeping all the time   · Unwillingness or inability to communicate  · Depression  · Unusual sadness, discouragement and loneliness  · Talk of wanting to die  · Neglect of personal appearance   · Rebelliousness- reckless behavior  · Withdrawal from people/activities they love  · Confusion- inability to concentrate     If you or a loved one observes any of these behaviors or has concerns about self-harm, here's what you can do:  · Talk about it- your feelings and reasons for harming yourself  · Remove any means that you might use to hurt yourself (examples: pills, rope, extension cords, firearm)  · Get professional help from the community (Mental Health, Substance Abuse, psychological counseling)  · Do not be alone:Call your Safe Contact- someone whom you trust who will be there for you.  · Call your local CRISIS HOTLINE 939-9092 or 207-516-8873  · Call your local Children's Mobile Crisis Response Team [place] (567) 432-7182 or www.[employer]  · Call the toll free National Suicide Prevention Hotlines   · National Suicide Prevention Lifeline 839-051-LXFD (9602)  National Hope Line Network 800-SUICIDE (756-8830)Radial Catherization Discharge Instructions      · Do not subject hand/arm to any forceful movements for 24 hours    i.e. supporting weight when rising from the chair or bed.   · Do not drive a car for 24 hours  · You may remove the dressing tomorrow  · You may shower on the day following your procedure.  Do not take a tub bath or submerge the puncture site in water for 3 days following the procedure.  · No Lifting more than 3-5 pounds with affected wrist for 5 days  · Follow up with Dr. Ness   2-4 weeks.  · Increase fluids for 2 days post procedure.  · Continue all previous medications unless otherwise instructed.    If bleeding should occur following discharge:  · Sit down and apply firm pressure to site with your fingers for  10 minutes  · If the bleeding stops, continue to sit quietly, keeping your wrist straight for 2 hours.  Notify physician as soon as possible ( [phone])  · If bleeding does not stop after 10 minutes, or if there is a large amount of bleeding or spurting, call 911 immediately.  Do not drive yourself to the hospital.    Additional Information     Discharge Education for patients on ROBSON (Obstructive Sleep Apnea) Protocol    Prior to receiving sedation or anesthesia, we screen all patients for Obstructive Sleep Apnea.  During your screening, you were identified as having suspected, but not confirmed Obstructive Sleep Apnea(ROBSON).    What is Obstructive Sleep Apnea?  Sleep apnea (AP-ne-ah) is a common disorder which involves breathing pauses that occur during sleep.  These can last from 10 seconds to a minute or longer.  Normal breathing resumes often with a loud snort or choking sound.    Sleep apnea occurs in all age groups and both genders but is more common in men and people over 40 years of age.  It has been estimated that as many as 18 million Americans have sleep apnea.  Most people who have sleep apnea don’t know they have it because it only occurs during sleep.  A family member and/or bed partner may first notice the signs of sleep apnea.  Sleep apnea is a chronic (ongoing) condition that disrupts the quality and quantity of your sleep repeatedly throughout the night.  This often results in excessive daytime sleepiness or fatigue during the day.  It may also contribute to high blood pressure, heart problems, and complications following medications used for surgery and procedures.    To establish a definitive diagnosis, further testing from a specialist would be needed.  We recommend that you follow up with your primary care physician.    We recommend that you should be with an adult observer for at least 24 hours after your sedation/anesthesia.  If you have a CPAP machine, you should wear it during any sleep  period (day or night) for the week following your procedure.  We encourage you to sleep on your side or in a sitting position, even with napping.  Lying flat on your back increases the risk of apnea and airway obstruction during your post procedure recovery period.    It is important to prevent over-sedation that could increase your risk for apnea.  Please take all pain medication as directed by your physician.  If you are not getting pain relief, please contact your physician to discuss possible approaches to relieving pain while minimizing medications that can affect your breathing and oxygen levels.                 Medication List      START taking these medications        Instructions    metoprolol SR 50 MG Tb24   Commonly known as:  TOPROL XL    Take 1 Tab by mouth every day.   Dose:  50 mg         CHANGE how you take these medications        Instructions    atorvastatin 80 MG tablet   What changed:    - medication strength  - how much to take  - when to take this   Commonly known as:  LIPITOR    Take 1 Tab by mouth every day.   Dose:  80 mg         CONTINUE taking these medications        Instructions    alprazolam 0.5 MG Tabs   Commonly known as:  XANAX    Take 0.5 mg by mouth 1 time daily as needed for Sleep.   Dose:  0.5 mg       aspirin EC 81 MG Tbec   Commonly known as:  ECOTRIN    Take 81 mg by mouth every day.   Dose:  81 mg       CENTRUM Tabs    Take 1 Tab by mouth every day.   Dose:  1 Tab       hydrochlorothiazide 12.5 MG capsule   Commonly known as:  MICROZIDE    Take 12.5 mg by mouth every day.   Dose:  12.5 mg       ibuprofen 200 MG Tabs   Commonly known as:  MOTRIN    Take 800 mg by mouth 1 time daily as needed.   Dose:  800 mg       insulin lispro 100 UNIT/ML Soln   Commonly known as:  HUMALOG    Inject 13-22 Units as instructed 2 Times a Day. 22 units and 13 units in pm   Dose:  13-22 Units       losartan 50 MG Tabs   Commonly known as:  COZAAR    Take 50 mg by mouth every day.   Dose:  50 mg          omeprazole 20 MG delayed-release capsule   Commonly known as:  PRILOSEC    Take 20 mg by mouth 1 time daily as needed.   Dose:  20 mg       TOUJEO SOLOSTAR 300 UNIT/ML Sopn   Generic drug:  Insulin Glargine    Inject 52 Units as instructed every day.   Dose:  52 Units       VITAMIN D PO    Take 1 Tab by mouth every day. 2000 units daily   Dose:  1 Tab               Medication Information     Above and/or attached are the medications your physician expects you to take upon discharge. Review all of your home medications and newly ordered medications with your doctor and/or pharmacist. Follow medication instructions as directed by your doctor and/or pharmacist. Please keep your medication list with you and share with your physician. Update the information when medications are discontinued, doses are changed, or new medications (including over-the-counter products) are added; and carry medication information at all times in the event of emergency situations.        Resources     Quit Smoking / Tobacco Use:    I understand the use of any tobacco products increases my chance of suffering from future heart disease or stroke and could cause other illnesses which may shorten my life. Quitting the use of tobacco products is the single most important thing I can do to improve my health. For further information on smoking / tobacco cessation call a Toll Free Quit Line at 1-431.688.2509 (*National Cancer [place]) or 1-646.544.3890 (American Lung Association) or you can access the web based program at www.lungusa.org.    [place] Tobacco Users Help Line:  (430) 154-1827       Toll Free: 1-553.587.4731  Quit Tobacco Program [facility] (724)536-0905    Crisis Hotline:    [place] Crisis Hotline:  5-909-VQNOCUZ or 1-758.461.5329    [place] Crisis Hotline:    1-565.611.1852 or 578-770-6479    Discharge Survey:   Thank you for choosing [facility]. We hope we did everything we could to make your hospital  stay a pleasant one. You may be receiving a survey and we would appreciate your time and participation in answering the questions. Your input is very valuable to us in our efforts to improve our service to our patients and their families.            Signatures     My signature on this form indicates that:    1. I acknowledge receipt and understanding of these Home Care Instruction.  2. My questions regarding this information have been answered to my satisfaction.  3. I have formulated a plan with my discharge nurse to obtain my prescribed medications for home.    __________________________________      __________________________________                   Patient Signature                                 Guardian/Responsible Adult Signature      __________________________________                 __________       ________                       Nurse Signature                                               Date                 Time

## 2017-02-21 ENCOUNTER — OFFICE VISIT (OUTPATIENT)
Dept: CARDIOLOGY | Facility: MEDICAL CENTER | Age: 67
End: 2017-02-21
Payer: COMMERCIAL

## 2017-02-21 VITALS
BODY MASS INDEX: 34.22 KG/M2 | WEIGHT: 239 LBS | DIASTOLIC BLOOD PRESSURE: 84 MMHG | OXYGEN SATURATION: 94 % | SYSTOLIC BLOOD PRESSURE: 124 MMHG | HEART RATE: 90 BPM | HEIGHT: 70 IN

## 2017-02-21 DIAGNOSIS — I25.10 CORONARY ARTERY DISEASE INVOLVING NATIVE CORONARY ARTERY OF NATIVE HEART WITHOUT ANGINA PECTORIS: ICD-10-CM

## 2017-02-21 DIAGNOSIS — I10 ESSENTIAL HYPERTENSION: ICD-10-CM

## 2017-02-21 DIAGNOSIS — Z86.73 HISTORY OF CVA (CEREBROVASCULAR ACCIDENT): ICD-10-CM

## 2017-02-21 DIAGNOSIS — E66.9 DIABETES MELLITUS TYPE 2 IN OBESE (HCC): ICD-10-CM

## 2017-02-21 DIAGNOSIS — E78.2 MIXED HYPERLIPIDEMIA: ICD-10-CM

## 2017-02-21 DIAGNOSIS — I65.23 BILATERAL CAROTID ARTERY STENOSIS: ICD-10-CM

## 2017-02-21 DIAGNOSIS — R94.30 ABNORMAL CARDIOVASCULAR FUNCTION STUDY: ICD-10-CM

## 2017-02-21 DIAGNOSIS — E11.69 DIABETES MELLITUS TYPE 2 IN OBESE (HCC): ICD-10-CM

## 2017-02-21 PROCEDURE — 99214 OFFICE O/P EST MOD 30 MIN: CPT | Performed by: NURSE PRACTITIONER

## 2017-02-21 ASSESSMENT — ENCOUNTER SYMPTOMS
CHILLS: 0
COUGH: 0
ORTHOPNEA: 0
MYALGIAS: 0
FEVER: 0
SHORTNESS OF BREATH: 1
ABDOMINAL PAIN: 0
DIZZINESS: 0
LOSS OF CONSCIOUSNESS: 0
BRUISES/BLEEDS EASILY: 0
HEADACHES: 0
PND: 0
PALPITATIONS: 0

## 2017-02-21 NOTE — PROGRESS NOTES
Subjective:   Yoan Ortega is a 66 y.o. male who presents today for hospital FU of coronary angiogram for abnormal MPI.    oYan is a 66 year old male with history of CVA in August 2016 with occluded left carotid artery, and moderate-severe stenosis of the KJ, status post right CEA in January 2017.  He also has hypertension, hyperlipidemia, diabetes and obesity.  A MPI done in December 2016 showed small area of ischemia and infarction in the anterior wall, LVEF 69% and positive EKG changes, and given he has was having angina type symptoms, coronary angiogram was done on 2/14/2017. This showed LAD 60% stenosis and normal LVEF. Medical therapy was adjusted with Toprol and high dose Lipitor 80mg.    He is here today for follow-up. Generally, he is feeling better. He would like to start exercising and lose some weight. He does still have some mild shortness of breath, but no overt chest pain or pressure. No palpitations. No dizziness or syncope; no edema. Glucose is running 130-170.  He does still have severe arthritis in his neck and shoulder.    Past Medical History   Diagnosis Date   • Arthritis    • Stroke (CMS-[facility]) August 2016     Blindness in left eye, partial recovery.   • Hypertension    • Hyperlipidemia    • Carotid stenosis, bilateral January 2017     Status post right CEA, and occluded left carotid   • COPD (chronic obstructive pulmonary disease) (CMS-HCC)    • Abnormal cardiovascular function study December 2016     MPI with small area of mixed ischemia and infarction, LVEF 69%. Positive EKG for ischemia in recovery.   • Myocardial infarct (CMS-HCC)    • Heart burn    • Indigestion    • Hiatus hernia syndrome    • Snoring    • Diabetes (CMS-HCC)      oral and insulin     Past Surgical History   Procedure Laterality Date   • Carotid endarterectomy Right 1/24/2017     Procedure: CAROTID ENDARTERECTOMY;  Surgeon: Dominik Soriano M.D.;  Location: SURGERY [facility];  Service:      History  reviewed. No pertinent family history.  History   Smoking status   • Never Smoker    Smokeless tobacco   • Never Used     No Known Allergies  Outpatient Encounter Prescriptions as of 2/21/2017   Medication Sig Dispense Refill   • atorvastatin (LIPITOR) 80 MG tablet Take 1 Tab by mouth every day. 30 Tab 1   • metoprolol SR (TOPROL XL) 50 MG TABLET SR 24 HR Take 1 Tab by mouth every day. 30 Tab 6   • omeprazole (PRILOSEC) 20 MG delayed-release capsule Take 20 mg by mouth 1 time daily as needed.     • losartan (COZAAR) 50 MG Tab Take 100 mg by mouth every day.     • Insulin Glargine (TOUJEO SOLOSTAR) 300 UNIT/ML Solution Pen-injector Inject 52 Units as instructed every day.     • insulin lispro (HUMALOG) 100 UNIT/ML Solution Inject 13-22 Units as instructed 2 Times a Day. 22 units and 13 units in pm     • alprazolam (XANAX) 0.5 MG Tab Take 0.5 mg by mouth 1 time daily as needed for Sleep.     • hydrochlorothiazide (MICROZIDE) 12.5 MG capsule Take 12.5 mg by mouth every day.     • ibuprofen (MOTRIN) 200 MG Tab Take 800 mg by mouth 1 time daily as needed.     • Multiple Vitamins-Minerals (CENTRUM) Tab Take 1 Tab by mouth every day.     • Cholecalciferol (VITAMIN D PO) Take 1 Tab by mouth every day. 2000 units daily     • aspirin EC (ECOTRIN) 81 MG Tablet Delayed Response Take 81 mg by mouth every day.       No facility-administered encounter medications on file as of 2/21/2017.     Review of Systems   Constitutional: Positive for malaise/fatigue. Negative for fever and chills.   Respiratory: Positive for shortness of breath. Negative for cough.         Mostly with exertion.   Cardiovascular: Negative for chest pain, palpitations, orthopnea, leg swelling and PND.   Gastrointestinal: Negative for abdominal pain.   Musculoskeletal: Negative for myalgias.   Skin: Negative for rash.   Neurological: Negative for dizziness, loss of consciousness and headaches.   Endo/Heme/Allergies: Does not bruise/bleed easily.         "Objective:   /84 mmHg  Pulse 90  Ht 1.778 m (5' 10\")  Wt 108.41 kg (239 lb)  BMI 34.29 kg/m2  SpO2 94%    Physical Exam   Constitutional: He is oriented to person, place, and time. He appears well-developed and well-nourished. No distress.   He is overweight (BMI 34.44)   HENT:   Head: Normocephalic.   Eyes: Conjunctivae and EOM are normal.   Neck: Normal range of motion. Neck supple. No JVD present.   Right CEA incision, healing well.   Cardiovascular: Normal rate, regular rhythm, normal heart sounds and intact distal pulses.  Exam reveals no gallop and no friction rub.    No murmur heard.  Right radial catheter incision site healing well; slight excoriation, but no redness or swelling or drainage. Mild bruising.   Pulmonary/Chest: Effort normal and breath sounds normal.   Abdominal: Soft. Bowel sounds are normal.   Musculoskeletal: Normal range of motion. He exhibits no edema.   Neurological: He is alert and oriented to person, place, and time.   Skin: Skin is warm and dry.   Psychiatric: He has a normal mood and affect. His behavior is normal.     IMPRESSION OF CORONARY ANGIOGRAM OF 2/14/2017 - REVIEWED WITH PATIENT:  1.  Coronary artery disease with 60% elongated lesion in the mid LAD just    distal to the origin of the third diagonal artery.  Fractional flow reserve    0.81 with wire placed well distally beyond the lesion.  2.  Normal left ventricular end-diastolic pressure.  3.  Normal left ventricular systolic function, ejection fraction of 66%.  4.  No other coronary artery disease.    Lab Results   Component Value Date/Time    WBC 8.4 02/13/2017 02:50 PM    RBC 5.35 02/13/2017 02:50 PM    HEMOGLOBIN 15.6 02/13/2017 02:50 PM    HEMATOCRIT 45.3 02/13/2017 02:50 PM    MCV 84.7 02/13/2017 02:50 PM    MCH 29.2 02/13/2017 02:50 PM    MCHC 34.4 02/13/2017 02:50 PM    MPV 10.2 02/13/2017 02:50 PM      Lab Results   Component Value Date/Time    SODIUM 139 02/13/2017 02:50 PM    POTASSIUM 3.8 02/13/2017 " 02:50 PM    CHLORIDE 101 02/13/2017 02:50 PM    CO2 28 02/13/2017 02:50 PM    GLUCOSE 102* 02/13/2017 02:50 PM    BUN 22 02/13/2017 02:50 PM    CREATININE 1.24 02/13/2017 02:50 PM       Assessment:     1. Abnormal cardiovascular function study     2. Coronary artery disease involving native coronary artery of native heart without angina pectoris  REFERRAL TO INTENSIVE CARDIAC REHAB/CARDIAC REHAB   3. Essential hypertension     4. Mixed hyperlipidemia  COMP METABOLIC PANEL    LIPID PROFILE   5. Diabetes mellitus type 2 in obese (CMS-[facility])  HEMOGLOBIN A1C   6. Bilateral carotid artery stenosis     7. History of CVA (cerebrovascular accident)         Medical Decision Making:  Today's Assessment / Status / Plan:     1. History of mildly abnormal MPI, with LAD 60% stenosis. Long discussion about risk factor modification, along with medical therapy. To continue ASA, statin, BB and ARB. He is also referred to cardiac rehab, which includes dietary counseling.    2. Hypertension, treated and stable.    3. Hyperlipidemia, treated with Lipitor. To repeat CMP and lipid panel prior to next appointment.    4. Diabetes mellitus, treated and followed by PCP.    5. Left carotid occlusion with recent right CEA, with some residual vision problems, secondary to CVA.    Same medications for now. Reviewed angiogram results along with labwork. To repeat labs in 3 months, prior to next follow-up. Follow-up sooner if clinical condition changes.    Collaborating MD: Alessandra

## 2017-02-21 NOTE — MR AVS SNAPSHOT
"Yoan Ortega   2017 9:40 AM   Office Visit   MRN: [id number]    Department:  [facility]   Dept Phone:  [phone]    Description:  Male : 1950   Provider:  ANEL Avila           Reason for Visit     Hospital Follow-up           Allergies as of 2017     No Known Allergies      You were diagnosed with     Abnormal cardiovascular function study   [896036]       Coronary artery disease involving native coronary artery of native heart without angina pectoris   [4287921]       Essential hypertension   [9672412]       Mixed hyperlipidemia   [272.2.ICD-9-CM]       Diabetes mellitus type 2 in obese (CMS-HCC)   [480545]       Bilateral carotid artery stenosis   [547002]       History of CVA (cerebrovascular accident)   [147029]         Vital Signs     Blood Pressure Pulse Height Weight Body Mass Index Oxygen Saturation    124/84 mmHg 102 1.778 m (5' 10\") 108.41 kg (239 lb) 34.29 kg/m2 94%    Smoking Status                   Never Smoker            Basic Information     Date Of Birth Sex Race Ethnicity Preferred Language    1950 Male White Non- English      Your appointments     2017  9:40 AM   HOSPITAL FOLLOW UP with ANEL Avila   [facility]    [address]  [facility] [address] [phone]            May 23, 2017  9:40 AM   FOLLOW UP with ANEL Avila   [facility]    [address]  [facility] [address] [phone]              Problem List              ICD-10-CM Priority Class Noted - Resolved    Bilateral carotid artery stenosis I65.23 High  2017 - Present    Diabetes mellitus type 2 in obese (CMS-HCC) E11.9, E66.9 Medium  2017 - Present    COPD (chronic obstructive pulmonary disease) (CMS-HCC) J44.9 Medium  2017 - Present    Hypertension I10 High  2017 - Present   " Hyperlipidemia E78.5 High  2/7/2017 - Present    Obesity E66.9 Medium  2/7/2017 - Present    History of CVA (cerebrovascular accident) Z86.73 High  2/7/2017 - Present    Abnormal cardiovascular function study R94.30 Medium  2/7/2017 - Present    CAD (coronary artery disease) I25.10 High  2/21/2017 - Present      Health Maintenance        Date Due Completion Dates    DIABETES MONOFILAMENT / LE EXAM 1950 ---    RETINAL SCREENING 6/12/1969 ---    URINE ACR / MICROALBUMIN 6/12/1969 ---    IMM DTaP/Tdap/Td Vaccine (1 - Tdap) 6/12/1970 ---    COLONOSCOPY 6/12/2001 ---    IMM ZOSTER VACCINE 6/12/2011 ---    IMM PNEUMOCOCCAL 65+ (ADULT) LOW/MEDIUM RISK SERIES (1 of 2 - PCV13) 6/12/2016 ---    FASTING LIPID PROFILE 11/7/2016 11/7/2015, 9/2/2015    A1C SCREENING 5/21/2017 11/21/2016, 3/23/2016, 11/7/2015, 9/2/2015    SERUM CREATININE 2/13/2018 2/13/2017, 1/23/2017, 11/21/2016, 3/23/2016, 11/7/2015, 9/2/2015            Current Immunizations     Influenza Vaccine Adult HD 10/25/2016      Below and/or attached are the medications your provider expects you to take. Review all of your home medications and newly ordered medications with your provider and/or pharmacist. Follow medication instructions as directed by your provider and/or pharmacist. Please keep your medication list with you and share with your provider. Update the information when medications are discontinued, doses are changed, or new medications (including over-the-counter products) are added; and carry medication information at all times in the event of emergency situations     Allergies:  No Known Allergies          Medications  Valid as of: February 21, 2017 -  9:20 AM    Generic Name Brand Name Tablet Size Instructions for use    ALPRAZolam (Tab) XANAX 0.5 MG Take 0.5 mg by mouth 1 time daily as needed for Sleep.        Aspirin (Tablet Delayed Response) ECOTRIN 81 MG Take 81 mg by mouth every day.        Atorvastatin Calcium (Tab) LIPITOR 80 MG Take 1 Tab  by mouth every day.        Cholecalciferol   Take 1 Tab by mouth every day. 2000 units daily        HydroCHLOROthiazide (Cap) MICROZIDE 12.5 MG Take 12.5 mg by mouth every day.        Ibuprofen (Tab) MOTRIN 200 MG Take 800 mg by mouth 1 time daily as needed.        Insulin Glargine (Solution Pen-injector) Insulin Glargine 300 UNIT/ML Inject 52 Units as instructed every day.        Insulin Lispro (Solution) HUMALOG 100 UNIT/ML Inject 13-22 Units as instructed 2 Times a Day. 22 units and 13 units in pm        Losartan Potassium (Tab) COZAAR 50 MG Take 100 mg by mouth every day.        Metoprolol Succinate (TABLET SR 24 HR) TOPROL XL 50 MG Take 1 Tab by mouth every day.        Multiple Vitamins-Minerals (Tab) CENTRUM  Take 1 Tab by mouth every day.        Omeprazole (CAPSULE DELAYED RELEASE) PRILOSEC 20 MG Take 20 mg by mouth 1 time daily as needed.        .                 Medicines prescribed today were sent to:     [facility][id number], [place] - [address] [address]    Phone: [phone] Fax: [phone]    Open 24 Hours?: No      Medication refill instructions:       If your prescription bottle indicates you have medication refills left, it is not necessary to call your provider’s office. Please contact your pharmacy and they will refill your medication.    If your prescription bottle indicates you do not have any refills left, you may request refills at any time through one of the following ways: The online [employer] system (except Urgent Care), by calling your provider’s office, or by asking your pharmacy to contact your provider’s office with a refill request. Medication refills are processed only during regular business hours and may not be available until the next business day. Your provider may request additional information or to have a follow-up visit with you prior to refilling your medication.   *Please Note: Medication refills are assigned a new Rx  number when refilled electronically. Your pharmacy may indicate that no refills were authorized even though a new prescription for the same medication is available at the pharmacy. Please request the medicine by name with the pharmacy before contacting your provider for a refill.        Your To Do List     Future Labs/Procedures Complete By Expires    COMP METABOLIC PANEL  As directed 2/22/2018    HEMOGLOBIN A1C  As directed 2/22/2018    LIPID PROFILE  As directed 2/22/2018      Referral     A referral request has been sent to our patient care coordination department. Please allow 3-5 business days for us to process this request and contact you either by phone or mail. If you do not hear from us by the 5th business day, please call us at [phone].           [employer] Access Code: [email or web address]  Expires: 2/24/2017 11:28 AM    [employer]  A secure, online tool to manage your health information     [employer]® is a secure, online tool that connects you to your personalized health information from the privacy of your home -- day or night - making it very easy for you to manage your healthcare. Once the activation process is completed, you can even access your medical information using the [employer] payton, which is available for free in the Apple Payton store or Google Play store.     [employer] provides the following levels of access (as shown below):   My Chart Features   [facility] Doctor [facility]  Specialists [facility]  Urgent  Care Non-[facility]  Primary Care  Doctor   Email your healthcare team securely and privately 24/7 X X X    Manage appointments: schedule your next appointment; view details of past/upcoming appointments X      Request prescription refills. X      View recent personal medical records, including lab and immunizations X X X X   View health record, including health history, allergies, medications X X X X   Read reports about your outpatient visits, procedures, consult and ER notes X X X X    See your discharge summary, which is a recap of your hospital and/or ER visit that includes your diagnosis, lab results, and care plan. X X       How to register for [employer]:  1. Go to  [email or web address].  2. Click on the Sign Up Now box, which takes you to the New Member Sign Up page. You will need to provide the following information:  a. Enter your [employer] Access Code exactly as it appears at the top of this page. (You will not need to use this code after you’ve completed the sign-up process. If you do not sign up before the expiration date, you must request a new code.)   b. Enter your date of birth.   c. Enter your home email address.   d. Click Submit, and follow the next screen’s instructions.  3. Create a [employer] ID. This will be your [employer] login ID and cannot be changed, so think of one that is secure and easy to remember.  4. Create a [employer] password. You can change your password at any time.  5. Enter your Password Reset Question and Answer. This can be used at a later time if you forget your password.   6. Enter your e-mail address. This allows you to receive e-mail notifications when new information is available in [employer].  7. Click Sign Up. You can now view your health information.    For assistance activating your [employer] account, call [phone]

## 2017-02-21 NOTE — Clinical Note
[facility]   [address]  [place] [address]  Phone: [phone]  Fax: [phone]              Yoan Ortega  1950    Encounter Date: 2/21/2017    ANEL Avila          PROGRESS NOTE:  Subjective:   Yoan Ortega is a 66 y.o. male who presents today for hospital FU of coronary angiogram for abnormal MPI.    Yoan is a 66 year old male with history of CVA in August 2016 with occluded left carotid artery, and moderate-severe stenosis of the KJ, status post right CEA in January 2017.  He also has hypertension, hyperlipidemia, diabetes and obesity.  A MPI done in December 2016 showed small area of ischemia and infarction in the anterior wall, LVEF 69% and positive EKG changes, and given he has was having angina type symptoms, coronary angiogram was done on 2/14/2017. This showed LAD 60% stenosis and normal LVEF. Medical therapy was adjusted with Toprol and high dose Lipitor 80mg.    He is here today for follow-up. Generally, he is feeling better. He would like to start exercising and lose some weight. He does still have some mild shortness of breath, but no overt chest pain or pressure. No palpitations. No dizziness or syncope; no edema. Glucose is running 130-170.  He does still have severe arthritis in his neck and shoulder.    Past Medical History   Diagnosis Date   • Arthritis    • Stroke (CMS-[facility]) August 2016     Blindness in left eye, partial recovery.   • Hypertension    • Hyperlipidemia    • Carotid stenosis, bilateral January 2017     Status post right CEA, and occluded left carotid   • COPD (chronic obstructive pulmonary disease) (CMS-[facility])    • Abnormal cardiovascular function study December 2016     MPI with small area of mixed ischemia and infarction, LVEF 69%. Positive EKG for ischemia in recovery.   • Myocardial infarct (CMS-[facility])    • Heart burn    • Indigestion    • Hiatus hernia syndrome    •  Snoring    • Diabetes (CMS-[facility])      oral and insulin     Past Surgical History   Procedure Laterality Date   • Carotid endarterectomy Right 1/24/2017     Procedure: CAROTID ENDARTERECTOMY;  Surgeon: Dominik Soriano M.D.;  Location: SURGERY [facility];  Service:      History reviewed. No pertinent family history.  History   Smoking status   • Never Smoker    Smokeless tobacco   • Never Used     No Known Allergies  Outpatient Encounter Prescriptions as of 2/21/2017   Medication Sig Dispense Refill   • atorvastatin (LIPITOR) 80 MG tablet Take 1 Tab by mouth every day. 30 Tab 1   • metoprolol SR (TOPROL XL) 50 MG TABLET SR 24 HR Take 1 Tab by mouth every day. 30 Tab 6   • omeprazole (PRILOSEC) 20 MG delayed-release capsule Take 20 mg by mouth 1 time daily as needed.     • losartan (COZAAR) 50 MG Tab Take 100 mg by mouth every day.     • Insulin Glargine (TOUJEO SOLOSTAR) 300 UNIT/ML Solution Pen-injector Inject 52 Units as instructed every day.     • insulin lispro (HUMALOG) 100 UNIT/ML Solution Inject 13-22 Units as instructed 2 Times a Day. 22 units and 13 units in pm     • alprazolam (XANAX) 0.5 MG Tab Take 0.5 mg by mouth 1 time daily as needed for Sleep.     • hydrochlorothiazide (MICROZIDE) 12.5 MG capsule Take 12.5 mg by mouth every day.     • ibuprofen (MOTRIN) 200 MG Tab Take 800 mg by mouth 1 time daily as needed.     • Multiple Vitamins-Minerals (CENTRUM) Tab Take 1 Tab by mouth every day.     • Cholecalciferol (VITAMIN D PO) Take 1 Tab by mouth every day. 2000 units daily     • aspirin EC (ECOTRIN) 81 MG Tablet Delayed Response Take 81 mg by mouth every day.       No facility-administered encounter medications on file as of 2/21/2017.     Review of Systems   Constitutional: Positive for malaise/fatigue. Negative for fever and chills.   Respiratory: Positive for shortness of breath. Negative for cough.         Mostly with exertion.   Cardiovascular: Negative for chest pain, palpitations, orthopnea,  "leg swelling and PND.   Gastrointestinal: Negative for abdominal pain.   Musculoskeletal: Negative for myalgias.   Skin: Negative for rash.   Neurological: Negative for dizziness, loss of consciousness and headaches.   Endo/Heme/Allergies: Does not bruise/bleed easily.        Objective:   /84 mmHg  Pulse 90  Ht 1.778 m (5' 10\")  Wt 108.41 kg (239 lb)  BMI 34.29 kg/m2  SpO2 94%    Physical Exam   Constitutional: He is oriented to person, place, and time. He appears well-developed and well-nourished. No distress.   He is overweight (BMI 34.44)   HENT:   Head: Normocephalic.   Eyes: Conjunctivae and EOM are normal.   Neck: Normal range of motion. Neck supple. No JVD present.   Right CEA incision, healing well.   Cardiovascular: Normal rate, regular rhythm, normal heart sounds and intact distal pulses.  Exam reveals no gallop and no friction rub.    No murmur heard.  Right radial catheter incision site healing well; slight excoriation, but no redness or swelling or drainage. Mild bruising.   Pulmonary/Chest: Effort normal and breath sounds normal.   Abdominal: Soft. Bowel sounds are normal.   Musculoskeletal: Normal range of motion. He exhibits no edema.   Neurological: He is alert and oriented to person, place, and time.   Skin: Skin is warm and dry.   Psychiatric: He has a normal mood and affect. His behavior is normal.     IMPRESSION OF CORONARY ANGIOGRAM OF 2/14/2017 - REVIEWED WITH PATIENT:  1.  Coronary artery disease with 60% elongated lesion in the mid LAD just    distal to the origin of the third diagonal artery.  Fractional flow reserve    0.81 with wire placed well distally beyond the lesion.  2.  Normal left ventricular end-diastolic pressure.  3.  Normal left ventricular systolic function, ejection fraction of 66%.  4.  No other coronary artery disease.    Lab Results   Component Value Date/Time    WBC 8.4 02/13/2017 02:50 PM    RBC 5.35 02/13/2017 02:50 PM    HEMOGLOBIN 15.6 02/13/2017 02:50 PM  "    HEMATOCRIT 45.3 02/13/2017 02:50 PM    MCV 84.7 02/13/2017 02:50 PM    MCH 29.2 02/13/2017 02:50 PM    MCHC 34.4 02/13/2017 02:50 PM    MPV 10.2 02/13/2017 02:50 PM      Lab Results   Component Value Date/Time    SODIUM 139 02/13/2017 02:50 PM    POTASSIUM 3.8 02/13/2017 02:50 PM    CHLORIDE 101 02/13/2017 02:50 PM    CO2 28 02/13/2017 02:50 PM    GLUCOSE 102* 02/13/2017 02:50 PM    BUN 22 02/13/2017 02:50 PM    CREATININE 1.24 02/13/2017 02:50 PM       Assessment:     1. Abnormal cardiovascular function study     2. Coronary artery disease involving native coronary artery of native heart without angina pectoris  REFERRAL TO INTENSIVE CARDIAC REHAB/CARDIAC REHAB   3. Essential hypertension     4. Mixed hyperlipidemia  COMP METABOLIC PANEL    LIPID PROFILE   5. Diabetes mellitus type 2 in obese (CMS-HCC)  HEMOGLOBIN A1C   6. Bilateral carotid artery stenosis     7. History of CVA (cerebrovascular accident)         Medical Decision Making:  Today's Assessment / Status / Plan:     1. History of mildly abnormal MPI, with LAD 60% stenosis. Long discussion about risk factor modification, along with medical therapy. To continue ASA, statin, BB and ARB. He is also referred to cardiac rehab, which includes dietary counseling.    2. Hypertension, treated and stable.    3. Hyperlipidemia, treated with Lipitor. To repeat CMP and lipid panel prior to next appointment.    4. Diabetes mellitus, treated and followed by PCP.    5. Left carotid occlusion with recent right CEA, with some residual vision problems, secondary to CVA.    Same medications for now. Reviewed angiogram results along with labwork. To repeat labs in 3 months, prior to next follow-up. Follow-up sooner if clinical condition changes.    Collaborating MD: Alessandra Rogers

## 2017-02-24 ENCOUNTER — APPOINTMENT (OUTPATIENT)
Dept: CARDIOLOGY | Facility: MEDICAL CENTER | Age: 67
End: 2017-02-24
Attending: INTERNAL MEDICINE
Payer: COMMERCIAL

## 2017-07-01 ENCOUNTER — HOSPITAL ENCOUNTER (OUTPATIENT)
Dept: LAB | Facility: MEDICAL CENTER | Age: 67
End: 2017-07-01
Attending: INTERNAL MEDICINE
Payer: COMMERCIAL

## 2017-07-01 LAB
ALBUMIN SERPL BCP-MCNC: 4.1 G/DL (ref 3.2–4.9)
ALBUMIN/GLOB SERPL: 1.2 G/DL
ALP SERPL-CCNC: 82 U/L (ref 30–99)
ALT SERPL-CCNC: 36 U/L (ref 2–50)
ANION GAP SERPL CALC-SCNC: 9 MMOL/L (ref 0–11.9)
AST SERPL-CCNC: 19 U/L (ref 12–45)
BILIRUB SERPL-MCNC: 0.6 MG/DL (ref 0.1–1.5)
BUN SERPL-MCNC: 22 MG/DL (ref 8–22)
CALCIUM SERPL-MCNC: 9.5 MG/DL (ref 8.5–10.5)
CHLORIDE SERPL-SCNC: 104 MMOL/L (ref 96–112)
CHOLEST SERPL-MCNC: 142 MG/DL (ref 100–199)
CO2 SERPL-SCNC: 27 MMOL/L (ref 20–33)
CREAT SERPL-MCNC: 1.09 MG/DL (ref 0.5–1.4)
CREAT UR-MCNC: 297.1 MG/DL
GFR SERPL CREATININE-BSD FRML MDRD: >60 ML/MIN/1.73 M 2
GLOBULIN SER CALC-MCNC: 3.4 G/DL (ref 1.9–3.5)
GLUCOSE SERPL-MCNC: 150 MG/DL (ref 65–99)
HDLC SERPL-MCNC: 40 MG/DL
LDLC SERPL CALC-MCNC: 64 MG/DL
POTASSIUM SERPL-SCNC: 3.9 MMOL/L (ref 3.6–5.5)
PROT SERPL-MCNC: 7.5 G/DL (ref 6–8.2)
PROT UR-MCNC: 13.8 MG/DL (ref 0–15)
PROT/CREAT UR: 46 MG/G (ref 15–68)
SODIUM SERPL-SCNC: 140 MMOL/L (ref 135–145)
T4 FREE SERPL-MCNC: 0.73 NG/DL (ref 0.53–1.43)
TRIGL SERPL-MCNC: 191 MG/DL (ref 0–149)
TSH SERPL DL<=0.005 MIU/L-ACNC: 1.68 UIU/ML (ref 0.3–3.7)

## 2017-07-01 PROCEDURE — 84443 ASSAY THYROID STIM HORMONE: CPT

## 2017-07-01 PROCEDURE — 36415 COLL VENOUS BLD VENIPUNCTURE: CPT

## 2017-07-01 PROCEDURE — 80053 COMPREHEN METABOLIC PANEL: CPT

## 2017-07-01 PROCEDURE — 84439 ASSAY OF FREE THYROXINE: CPT

## 2017-07-01 PROCEDURE — 80061 LIPID PANEL: CPT

## 2017-07-01 PROCEDURE — 82570 ASSAY OF URINE CREATININE: CPT

## 2017-07-01 PROCEDURE — 84156 ASSAY OF PROTEIN URINE: CPT

## 2017-07-31 ENCOUNTER — HOSPITAL ENCOUNTER (OUTPATIENT)
Dept: LAB | Facility: MEDICAL CENTER | Age: 67
End: 2017-07-31
Attending: INTERNAL MEDICINE
Payer: COMMERCIAL

## 2017-07-31 PROCEDURE — 84402 ASSAY OF FREE TESTOSTERONE: CPT

## 2017-07-31 PROCEDURE — 36415 COLL VENOUS BLD VENIPUNCTURE: CPT

## 2017-07-31 PROCEDURE — 84403 ASSAY OF TOTAL TESTOSTERONE: CPT

## 2017-07-31 PROCEDURE — 84270 ASSAY OF SEX HORMONE GLOBUL: CPT

## 2017-08-02 LAB
SHBG SERPL-SCNC: 41 NMOL/L (ref 11–80)
TESTOST FREE MFR SERPL: 1.6 % (ref 1.6–2.9)
TESTOST FREE SERPL-MCNC: 51 PG/ML (ref 47–244)
TESTOST SERPL-MCNC: 319 NG/DL (ref 300–720)

## 2017-12-18 ENCOUNTER — HOSPITAL ENCOUNTER (OUTPATIENT)
Dept: LAB | Facility: MEDICAL CENTER | Age: 67
End: 2017-12-18
Attending: INTERNAL MEDICINE
Payer: COMMERCIAL

## 2017-12-18 LAB
25(OH)D3 SERPL-MCNC: 41 NG/ML (ref 30–100)
ANION GAP SERPL CALC-SCNC: 8 MMOL/L (ref 0–11.9)
BUN SERPL-MCNC: 18 MG/DL (ref 8–22)
CALCIUM SERPL-MCNC: 8.9 MG/DL (ref 8.5–10.5)
CHLORIDE SERPL-SCNC: 98 MMOL/L (ref 96–112)
CO2 SERPL-SCNC: 28 MMOL/L (ref 20–33)
CREAT SERPL-MCNC: 1.1 MG/DL (ref 0.5–1.4)
EST. AVERAGE GLUCOSE BLD GHB EST-MCNC: 180 MG/DL
GFR SERPL CREATININE-BSD FRML MDRD: >60 ML/MIN/1.73 M 2
GLUCOSE SERPL-MCNC: 183 MG/DL (ref 65–99)
HBA1C MFR BLD: 7.9 % (ref 0–5.6)
POTASSIUM SERPL-SCNC: 3.4 MMOL/L (ref 3.6–5.5)
SODIUM SERPL-SCNC: 134 MMOL/L (ref 135–145)

## 2017-12-18 PROCEDURE — 36415 COLL VENOUS BLD VENIPUNCTURE: CPT

## 2017-12-18 PROCEDURE — 82306 VITAMIN D 25 HYDROXY: CPT

## 2017-12-18 PROCEDURE — 84403 ASSAY OF TOTAL TESTOSTERONE: CPT

## 2017-12-18 PROCEDURE — 83036 HEMOGLOBIN GLYCOSYLATED A1C: CPT

## 2017-12-18 PROCEDURE — 80048 BASIC METABOLIC PNL TOTAL CA: CPT

## 2017-12-18 PROCEDURE — 84270 ASSAY OF SEX HORMONE GLOBUL: CPT

## 2017-12-18 PROCEDURE — 84402 ASSAY OF FREE TESTOSTERONE: CPT

## 2017-12-20 LAB
SHBG SERPL-SCNC: 42 NMOL/L (ref 11–80)
TESTOST FREE MFR SERPL: 1.5 % (ref 1.6–2.9)
TESTOST FREE SERPL-MCNC: 42 PG/ML (ref 47–244)
TESTOST SERPL-MCNC: 271 NG/DL (ref 300–720)

## 2018-02-15 DIAGNOSIS — I10 ESSENTIAL HYPERTENSION: ICD-10-CM

## 2018-02-15 RX ORDER — LOSARTAN POTASSIUM 100 MG/1
100 TABLET ORAL DAILY
Qty: 90 TAB | Refills: 3 | Status: SHIPPED | OUTPATIENT
Start: 2018-02-15 | End: 2019-02-14 | Stop reason: SDUPTHER

## 2018-03-06 ENCOUNTER — OFFICE VISIT (OUTPATIENT)
Dept: MEDICAL GROUP | Age: 68
End: 2018-03-06
Payer: COMMERCIAL

## 2018-03-06 VITALS
OXYGEN SATURATION: 97 % | RESPIRATION RATE: 16 BRPM | DIASTOLIC BLOOD PRESSURE: 90 MMHG | TEMPERATURE: 97.9 F | SYSTOLIC BLOOD PRESSURE: 144 MMHG | HEIGHT: 70 IN | BODY MASS INDEX: 36.36 KG/M2 | WEIGHT: 254 LBS | HEART RATE: 108 BPM

## 2018-03-06 DIAGNOSIS — I65.23 BILATERAL CAROTID ARTERY STENOSIS: ICD-10-CM

## 2018-03-06 DIAGNOSIS — Z13.6 ENCOUNTER FOR ABDOMINAL AORTIC ANEURYSM (AAA) SCREENING: ICD-10-CM

## 2018-03-06 DIAGNOSIS — I25.10 CORONARY ARTERY DISEASE INVOLVING NATIVE CORONARY ARTERY OF NATIVE HEART WITHOUT ANGINA PECTORIS: ICD-10-CM

## 2018-03-06 DIAGNOSIS — E66.09 CLASS 1 OBESITY DUE TO EXCESS CALORIES WITH SERIOUS COMORBIDITY IN ADULT, UNSPECIFIED BMI: ICD-10-CM

## 2018-03-06 DIAGNOSIS — L82.0 INFLAMED SEBORRHEIC KERATOSIS: ICD-10-CM

## 2018-03-06 DIAGNOSIS — Z00.00 PREVENTATIVE HEALTH CARE: ICD-10-CM

## 2018-03-06 DIAGNOSIS — E11.69 DIABETES MELLITUS TYPE 2 IN OBESE (HCC): ICD-10-CM

## 2018-03-06 DIAGNOSIS — I10 ESSENTIAL HYPERTENSION: ICD-10-CM

## 2018-03-06 DIAGNOSIS — L40.9 PSORIASIS: ICD-10-CM

## 2018-03-06 DIAGNOSIS — E66.9 DIABETES MELLITUS TYPE 2 IN OBESE (HCC): ICD-10-CM

## 2018-03-06 DIAGNOSIS — G89.4 CHRONIC PAIN SYNDROME: ICD-10-CM

## 2018-03-06 DIAGNOSIS — L57.0 AK (ACTINIC KERATOSIS): ICD-10-CM

## 2018-03-06 PROCEDURE — 17110 DESTRUCTION B9 LES UP TO 14: CPT | Performed by: FAMILY MEDICINE

## 2018-03-06 PROCEDURE — 17000 DESTRUCT PREMALG LESION: CPT | Mod: 59 | Performed by: FAMILY MEDICINE

## 2018-03-06 PROCEDURE — 17003 DESTRUCT PREMALG LES 2-14: CPT | Performed by: FAMILY MEDICINE

## 2018-03-06 PROCEDURE — 99204 OFFICE O/P NEW MOD 45 MIN: CPT | Mod: 25 | Performed by: FAMILY MEDICINE

## 2018-03-06 RX ORDER — ALPRAZOLAM 1 MG/1
TABLET ORAL
COMMUNITY
Start: 2017-12-19 | End: 2018-05-22 | Stop reason: SDUPTHER

## 2018-03-06 RX ORDER — FELODIPINE 5 MG/1
TABLET, EXTENDED RELEASE ORAL
COMMUNITY
Start: 2017-12-24 | End: 2019-02-01

## 2018-03-06 RX ORDER — IBUPROFEN 800 MG/1
TABLET ORAL PRN
COMMUNITY
Start: 2018-02-05 | End: 2019-05-10 | Stop reason: SDUPTHER

## 2018-03-06 RX ORDER — METOPROLOL SUCCINATE 100 MG/1
TABLET, EXTENDED RELEASE ORAL
COMMUNITY
Start: 2018-01-27 | End: 2018-04-03 | Stop reason: SDUPTHER

## 2018-03-06 RX ORDER — PEN NEEDLE, DIABETIC 30 GX3/16"
NEEDLE, DISPOSABLE MISCELLANEOUS
COMMUNITY
Start: 2018-02-14

## 2018-03-06 RX ORDER — POTASSIUM CHLORIDE 750 MG/1
10 TABLET, EXTENDED RELEASE ORAL DAILY
COMMUNITY
End: 2019-03-07 | Stop reason: SDUPTHER

## 2018-03-06 RX ORDER — METOPROLOL SUCCINATE 50 MG/1
50 TABLET, EXTENDED RELEASE ORAL DAILY
Qty: 30 TAB | Refills: 6 | Status: SHIPPED | OUTPATIENT
Start: 2018-03-06 | End: 2018-04-03

## 2018-03-06 RX ORDER — LANCETS 30 GAUGE
EACH MISCELLANEOUS
Qty: 100 EACH | Refills: 3 | Status: SHIPPED | OUTPATIENT
Start: 2018-03-06

## 2018-03-06 RX ORDER — OXYCODONE HCL 10 MG/1
10 TABLET, FILM COATED, EXTENDED RELEASE ORAL
Qty: 30 TAB | Refills: 0 | Status: SHIPPED | OUTPATIENT
Start: 2018-03-06 | End: 2018-05-05

## 2018-03-06 RX ORDER — INSULIN LISPRO 100 [IU]/ML
INJECTION, SOLUTION INTRAVENOUS; SUBCUTANEOUS
COMMUNITY
Start: 2018-02-02 | End: 2018-04-11 | Stop reason: SDUPTHER

## 2018-03-06 RX ORDER — CLOBETASOL PROPIONATE 0.5 MG/G
OINTMENT TOPICAL
Qty: 30 G | Refills: 1 | Status: SHIPPED | OUTPATIENT
Start: 2018-03-06 | End: 2019-02-14 | Stop reason: SDUPTHER

## 2018-03-06 RX ORDER — CYCLOBENZAPRINE HCL 10 MG
10 TABLET ORAL PRN
COMMUNITY

## 2018-03-06 RX ORDER — POTASSIUM CHLORIDE 750 MG/1
CAPSULE, EXTENDED RELEASE ORAL
COMMUNITY
Start: 2018-01-24 | End: 2018-06-28 | Stop reason: SDUPTHER

## 2018-03-06 RX ORDER — HYDROCHLOROTHIAZIDE 25 MG/1
TABLET ORAL
COMMUNITY
Start: 2018-01-13 | End: 2018-04-24 | Stop reason: SDUPTHER

## 2018-03-06 RX ORDER — ATORVASTATIN CALCIUM 80 MG/1
80 TABLET, FILM COATED ORAL DAILY
Qty: 30 TAB | Refills: 1 | Status: SHIPPED | OUTPATIENT
Start: 2018-03-06 | End: 2018-07-16 | Stop reason: SDUPTHER

## 2018-03-06 RX ORDER — OXYCODONE HCL 10 MG/1
10 TABLET, FILM COATED, EXTENDED RELEASE ORAL EVERY 12 HOURS
COMMUNITY
End: 2018-03-06 | Stop reason: SDUPTHER

## 2018-03-06 ASSESSMENT — PATIENT HEALTH QUESTIONNAIRE - PHQ9: CLINICAL INTERPRETATION OF PHQ2 SCORE: 0

## 2018-03-06 NOTE — ASSESSMENT & PLAN NOTE
Patient has been complaining of lesions which have been bleeding when washing, flaking,getting caught on chlothing, painfull and causing discomfort so is interested in removal.

## 2018-03-06 NOTE — PROGRESS NOTES
This medical record contains text that has been entered with the assistance of computer voice recognition and dictation software.  Therefore, it may contain unintended errors in text, spelling, punctuation, or grammar    Chief Complaint   Patient presents with   • Establish Care     weight loss       Porter Ortega is a 67 y.o. male here evaluation and management of: establish care, htn, DM, HTN, HLD, obesity, ak, isk      HPI:     Bilateral carotid artery stenosis  Right CEA and 100% occlusion on left  With CVA in 2016, with right eye blindness with partial recovery    Diabetes mellitus type 2 in obese (CMS-HCC)  Toujeo 54 units Qam  humulog 28 units Qam  And 18 qhs    The patient denies  any polydipsia, polyuria, polyphagia, weight loss, no numbness or tingling anywhere, no changes in vision, no ulcerations or poor healing wounds.    Results for PORTER ORTEGA (MRN [id number]) as of 3/6/2018 15:51   Ref. Range 12/18/2017 09:23   Glycohemoglobin Latest Ref Range: 0.0 - 5.6 % 7.9 (H)   Estim. Avg Glu Latest Units: mg/dL 180       Hypertension  Felodipine 5mg po q24h  HCTZ 25mg  Metoprolol 50mg po q24h    The patient has been tollerating the BP meds without any issues. No tunnel vision, no cough, no changes in vision, no lightheadedness, no fatigue, no syncopal or presyncopal episodes, no edema, no new rashes.       Preventative health care  The patient is a 67-year-old male who presents to clinic to [facility]. His significant past medical history of diabetes mellitus, COPD, coronary artery disease, history of cerebral vascular accident, obesity, hypertension, hyperlipidemia, chronic pain.   The patient denied any chest pain, no sob, no vargas, no  pnd, no orthopnea, no headache, no changes in vision, no numbness or tingling, no nausea, no diarrhea, no abdominal pain, no fevers, no chills, no bright red blood per rectum, no  difficulty urinating, no burning during micturition, no depressed mood, no  other concerns.      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            Chronic pain syndrome  The patient states that he might take one oxycodone 10 mg every 3 days and the lower lumbar pain.    Current medicines (including changes today)  Current Outpatient Prescriptions   Medication Sig Dispense Refill   • insulin lispro (HUMALOG) 100 UNIT/ML Solution by Other route.     • Insulin Glargine 300 UNIT/ML Solution Pen-injector 54 Units by Subdermal route.     • ALPRAZolam (XANAX) 1 MG Tab      • Insulin Pen Needle (PEN NEEDLES) 32G X 4 MM Misc      • HUMALOG KWIKPEN 100 UNIT/ML Solution Pen-injector injection      • ibuprofen (MOTRIN) 800 MG Tab      • potassium chloride SA (K-DUR) 10 MEQ Tab CR Take 10 mEq by mouth 2 times a day.     • esomeprazole (NEXIUM) 20 MG capsule Take 20 mg by mouth every morning before breakfast.     • cyclobenzaprine (FLEXERIL) 10 MG Tab Take 10 mg by mouth 3 times a day as needed.     • clobetasol (TEMOVATE) 0.05 % Ointment AAA bid x 14 days 30 g 1   • Insulin Glargine (TOUJEO SOLOSTAR) 300 UNIT/ML Solution Pen-injector Inject 54 Units as instructed every morning. 1 PEN 2   • Lancets Misc Lancets order: Lancets for [employer] Lite meter. Sig: use bid and prn ssx high or low sugar. 100 Each 3   • Blood Glucose Monitoring Suppl Supplies Misc Test strips order: Test strips for [employer] Lite meter. Sig: use bid and prn ssx high or low sugar 100 Each 3   • oxyCODONE CR (OXYCONTIN) 10 MG Tablet Extended Release 12 hour Abuse-Deterrent Take 1 Tab by mouth every 24 hours as needed for up to 60 days. 30 Tab 0   • atorvastatin (LIPITOR) 80 MG tablet Take  1 Tab by mouth every day. 30 Tab 1   • metoprolol SR (TOPROL XL) 50 MG TABLET SR 24 HR Take 1 Tab by mouth every day. 30 Tab 6   • losartan (COZAAR) 100 MG Tab Take 1 Tab by mouth every day. 90 Tab 3   • omeprazole (PRILOSEC) 20 MG delayed-release capsule Take 20 mg by mouth 1 time daily as needed.     • insulin lispro (HUMALOG) 100 UNIT/ML Solution Inject 13-22 Units as instructed 2 Times a Day. 22 units and 13 units in pm     • hydrochlorothiazide (MICROZIDE) 12.5 MG capsule Take 12.5 mg by mouth every day.     • Multiple Vitamins-Minerals (CENTRUM) Tab Take 1 Tab by mouth every day.     • Cholecalciferol (VITAMIN D PO) Take 1 Tab by mouth every day. 2000 units daily     • aspirin EC (ECOTRIN) 81 MG Tablet Delayed Response Take 81 mg by mouth every day.     • clopidogrel (PLAVIX) 75 MG Tab Take 1 Tab by mouth every day. 90 Tab 1   • ATENOLOL PO Take  by mouth.     • felodipine (PLENDIL) 5 MG TABLET SR 24 HR      • hydroCHLOROthiazide (HYDRODIURIL) 25 MG Tab      • potassium chloride (MICRO-K) 10 MEQ capsule      • metoprolol SR (TOPROL XL) 100 MG TABLET SR 24 HR      • alprazolam (XANAX) 0.5 MG Tab Take 0.5 mg by mouth 1 time daily as needed for Sleep.     • ibuprofen (MOTRIN) 200 MG Tab Take 800 mg by mouth 1 time daily as needed.       No current facility-administered medications for this visit.      He  has a past medical history of Abnormal cardiovascular function study (December 2016); Arthritis; Carotid stenosis, bilateral (January 2017); COPD (chronic obstructive pulmonary disease) (CMS-HCC); Diabetes (CMS-HCC); Heart burn; Hiatus hernia syndrome; Hyperlipidemia; Hypertension; Indigestion; Myocardial infarct; Snoring; and Stroke (CMS-HCC) (August 2016).  He  has a past surgical history that includes carotid endarterectomy (Right, 1/24/2017).  Social History   Substance Use Topics   • Smoking status: Never Smoker   • Smokeless tobacco: Never Used   • Alcohol use No     Social History     Social History  "Narrative   • No narrative on file     History reviewed. No pertinent family history.  No family status information on file.         ROS    Please see hpi     All other systems reviewed and are negative     Objective:     Blood pressure 144/90, pulse (!) 108, temperature 36.6 °C (97.9 °F), resp. rate 16, height 1.778 m (5' 10\"), weight 115.2 kg (254 lb), SpO2 97 %. Body mass index is 36.45 kg/m².  Physical Exam:    Constitutional: Alert, no distress.  Skin: Warm, dry, good turgor, no rashes in visible areas.  Eye: Equal, round and reactive, conjunctiva clear, lids normal.  ENMT: Lips without lesions, good dentition, oropharynx clear.  Neck: Trachea midline, no masses, no thyromegaly. No cervical or supraclavicular lymphadenopathy.  Respiratory: Unlabored respiratory effort, lungs clear to auscultation, no wheezes, no ronchi.  Cardiovascular: Normal S1, S2, no murmur, no edema.  Abdomen: Soft, non-tender, no masses, no hepatosplenomegaly.  Psych: Alert and oriented x3, normal affect and mood.      SKIN EXAM  Several irregular, pigmented, verrucous surface plaques with dried hemmorhage , greater than 1 cm on back and chest,       multiple lesions on bilateral forearms, hands, and chest, with evidence of of solar damage present , spotty hyperpigmentation, scattered telangiectasias, and  Xerosis      PROCEDURE: CRYOTHERAPY  Discussed risks and benefits of cryotherapy including but not limited to scarring, hyperpigmentation, hypopigmentation, hypertrophic scarring, keiloid scarring, incomplete or no resolution of lesions treated,pain, undesirable cosemetic result, blistering, potential need for additional treatment including more invasive treatment. Patient expresses understanding and verbally acknowledges risks and consent to treatment. 2  applications of cryotherapy with 3 second freeze thaw cycle was applied to  4AK's and  all irritated and inflamed Seborrheic Keratoses. Patient tolerated procedure well. There were no " complications. Aftercare instructions given.            Assessment and Plan:   The following treatment plan was discussed      1. Bilateral carotid artery stenosis  Continue tight bp control  Tight DM controll  Add Plavix 75mg to Asa  This was discussed with cardiology  And they are in agreement.     2. Diabetes mellitus type 2 in obese (CMS-HCC)      The patient was counseled on the following  1. meticulous bilateral feet inspection daily  2. yearly dilated eye exams,   3. Wt. loss of 5% will decrease insulin resistance follow a low-fat diet and to begin an exercise program  4.  Pt. is on baby aspirin  5. Patient  is on an ace inhibitor will check microalbumin yearly  6. BP goal is ,130/80, and LDL goal is <70  7. Hba1c goal is less than 7  8. Recommend cmp and A1C evaluation every 3 months  9. Check blood sugar twice daily  and minimum once daily at various times  10. Also needs diabetic education  11. Recommend vaccinations: Yearly Flu, Pneumvox, and hepatitis B vaccine  12. Yearly dental exam  13. Patient is on a Statin    we discussed treat to target recommendations to self titrate the dose of Long Acting Insulin(2 units every 3 days) to achieve fasting blood sugar in the morning in the range of     We discussed the importance of limiting carbohydrate intake  Advised to continue checking blood sugars twice a day        - Insulin Glargine (TOUJEO SOLOSTAR) 300 UNIT/ML Solution Pen-injector; Inject 54 Units as instructed every morning.  Dispense: 1 PEN; Refill: 2  - Lancets Misc; Lancets order: Lancets for [employer] [place] Lite meter. Sig: use bid and prn ssx high or low sugar.  Dispense: 100 Each; Refill: 3  - Blood Glucose Monitoring Suppl Supplies Misc; Test strips order: Test strips for [employer] [place] Lite meter. Sig: use bid and prn ssx high or low sugar  Dispense: 100 Each; Refill: 3  - COMP METABOLIC PANEL; Future  - CBC WITH DIFFERENTIAL; Future  - HEMOGLOBIN A1C; Future  - LIPID PROFILE; Future  -  REFERRAL TO ENDOCRINOLOGY    3. Essential hypertension    Patient was given instructions to make a two-week blood pressure log  To measure his blood pressure morning and evening same time  Then send results back to us  We will consider specific management at that time  No at goal    - atorvastatin (LIPITOR) 80 MG tablet; Take 1 Tab by mouth every day.  Dispense: 30 Tab; Refill: 1  - metoprolol SR (TOPROL XL) 50 MG TABLET SR 24 HR; Take 1 Tab by mouth every day.  Dispense: 30 Tab; Refill: 6    4. Class 1 obesity due to excess calories with serious comorbidity in adult, unspecified BMI      We discussed subsequent randomized trials, weight loss (via lifestyle or pharmacologic therapy) has been shown to reduce morbidity (reduction in risk factors for cardiovascular disease     The frequently cited Diabetes Prevention Program (DPP) significantly reduced the rate of progression from impaired glucose tolerance to diabetes over a three-year period in participants randomized to intensive lifestyle modification focusing on weight loss       We also discussed agressive lifestyle modifications with weight loss, aerobic exercise, and eating a prudent diet, which  included avoiding fried foods, using low-fat milk and avoiding simple carbohydrate, making a food diary. If you can't run because of pain do the stationary bike/elipticle or swim 30minutes 3 times per wk, in the beginning and then gradually increase.        5. Preventative health care    Care has been established  We need baseline labs to establish a clinical profile  We will then consider daily prophylactic aspirin   In order to weigh  the benefits vs risk of GI bleed    We reviewed USPSTF guidelines  The USPSTF recommends initiating low-dose aspirin use for the primary prevention of cardiovascular disease (CVD) and colorectal cancer (CRC) in adults aged 50 to 59 years who have a 10% or greater 10-year CVD risk, are not at increased risk for bleeding, have a life  expectancy of at least 10 years, and are willing to take low-dose aspirin daily for at least 10 years.      The decision to initiate low-dose aspirin use for the primary prevention of CVD and CRC in adults aged 60 to 69 years who have a 10% or greater 10-year CVD risk should be an individual one    The current evidence is insufficient to assess the balance of benefits and harms of initiating aspirin use for the primary prevention of CVD and CRC in adults aged 70 years or older.     AORTA for AAA Screening in smokers for patients age 65 and older      This patient is due for mammogram  Up to date on colonoscopy   Requested Medical records to be sent to us  Denies intimate partner viloence        6. Encounter for abdominal aortic aneurysm (AAA) screening    Due for AAA screening    - US-AORTA; Future    7. Coronary artery disease involving native coronary artery of native heart without angina pectoris    Tight glycemic control  Tight BP control  Continue to follow with cardiology  Weight loss is a must  Cardiovascular exercise is also crucial    8. Psoriasis      - clobetasol (TEMOVATE) 0.05 % Ointment; AAA bid x 14 days  Dispense: 30 g; Refill: 1    9. Chronic pain syndrome    Reviewed pain contract with patient -- it was signed  The patient  may have to give random urine drug screens    will not have the pain meds refilled early, whether he lost meds or not    We reviewed all side effects including but not limited to respiratory depression and death  - oxyCODONE CR (OXYCONTIN) 10 MG Tablet Extended Release 12 hour Abuse-Deterrent; Take 1 Tab by mouth every 24 hours as needed for up to 60 days.  Dispense: 30 Tab; Refill: 0  - CONSENT FOR OPIATE PRESCRIPTION    10. AK (actinic keratosis)  Patient tollerrated procedure well  There were no adverse events  Patient was given post procedure precautions       11. Inflamed seborrheic keratosis  Patient tollerrated procedure well  There were no adverse events  Patient was  given post procedure precautions           HEALTH MAINTENANCE:need previous records    Instructed to Follow up in clinic or ER for worsening symptoms, difficulty breathing, lack of expected recovery, or should new symptoms or problems arise.    Followup: Return in about 2 weeks (around 3/20/2018) for Reevaluation, 2 week BP log, DM-RN APPOINTMENT.       Once again this medical record contains text that has been entered with the assistance of computer voice recognition and dictation software.  Therefore, it may contain unintended errors in text, spelling, punctuation, or grammar

## 2018-03-06 NOTE — ASSESSMENT & PLAN NOTE
Felodipine 5mg po q24h  HCTZ 25mg  Metoprolol 50mg po q24h    The patient has been tollerating the BP meds without any issues. No tunnel vision, no cough, no changes in vision, no lightheadedness, no fatigue, no syncopal or presyncopal episodes, no edema, no new rashes.

## 2018-03-06 NOTE — LETTER
we discussed treat to target recommendations to self titrate the dose of Long Acting Insulin(2 units every 2-3 days) to achieve fasting blood sugar in the morning in the range of

## 2018-03-06 NOTE — ASSESSMENT & PLAN NOTE
Toujeo 54 units Qam  humulog 28 units Qam  And 18 qhs    The patient denies  any polydipsia, polyuria, polyphagia, weight loss, no numbness or tingling anywhere, no changes in vision, no ulcerations or poor healing wounds.    Results for PORTER DEGROOT (MRN [id number]) as of 3/6/2018 15:51   Ref. Range 12/18/2017 09:23   Glycohemoglobin Latest Ref Range: 0.0 - 5.6 % 7.9 (H)   Estim. Avg Glu Latest Units: mg/dL 180

## 2018-03-06 NOTE — ASSESSMENT & PLAN NOTE
Right CEA and 100% occlusion on left  With CVA in 2016, with right eye blindness with partial recovery

## 2018-03-06 NOTE — ASSESSMENT & PLAN NOTE
The patient is a 67-year-old male who presents to clinic to establish care. His significant past medical history of diabetes mellitus, COPD, coronary artery disease, history of cerebral vascular accident, obesity, hypertension, hyperlipidemia, chronic pain.   The patient denied any chest pain, no sob, no vargas, no  pnd, no orthopnea, no headache, no changes in vision, no numbness or tingling, no nausea, no diarrhea, no abdominal pain, no fevers, no chills, no bright red blood per rectum, no  difficulty urinating, no burning during micturition, no depressed mood, no other concerns.      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

## 2018-03-07 ENCOUNTER — TELEPHONE (OUTPATIENT)
Dept: MEDICAL GROUP | Age: 68
End: 2018-03-07

## 2018-03-07 RX ORDER — CLOPIDOGREL BISULFATE 75 MG/1
75 TABLET ORAL DAILY
Qty: 90 TAB | Refills: 1 | Status: SHIPPED | OUTPATIENT
Start: 2018-03-07 | End: 2018-09-07 | Stop reason: SDUPTHER

## 2018-03-07 NOTE — TELEPHONE ENCOUNTER
MEDICATION PRIOR AUTHORIZATION NEEDED:    1. Name of Medication: oxyCODONE CR (OXYCONTIN) 10 MG Tablet Extended Release 12 hour Abuse-Deterrent    2. Requested By (Name of Pharmacy):   [id number] - [place], [place] - [address]  [address] [address]  Phone: [phone] Fax: [phone]     3. Is insurance on file current? Cardholder ID [id number]  BIN [id number]    4. What is the name & phone number of the 3rd party payor? [employer] [phone]    Would you like a PAR started?

## 2018-03-07 NOTE — TELEPHONE ENCOUNTER
PT Lifestyle test strips and Lancets are not covered by insurance do you want to prescribe a different medication          Was the patient seen in the last year in this department? Yes     Does patient have an active prescription for medications requested? No     Received Request Via: Pharmacy

## 2018-03-09 NOTE — TELEPHONE ENCOUNTER
Phone Number Called: [phone] (home)       Message: Spoke to patient.  He will call back when he knows what brand is covered by his insurance.    Left Message for patient to call back: no

## 2018-03-09 NOTE — TELEPHONE ENCOUNTER
DOCUMENTATION OF PAR STATUS:    1. Name of Medication & Dose: oxyCODONE CR (OXYCONTIN) 10 MG Tablet Extended Release 12 hour Abuse-Deterrent     2. Name of Prescription Coverage Company & phone #: [employer] [phone]    3. Date Prior Auth Submitted: 03/08/18    4. What information was given to obtain insurance decision? Diagnosis    5. Prior Auth Status? Pending    6. Patient Notified: no

## 2018-03-14 NOTE — TELEPHONE ENCOUNTER
"FINAL PRIOR AUTHORIZATION STATUS:    1.  Name of Medication & Dose: oxyCODONE CR (OXYCONTIN) 10 MG Tablet Extended Release 12 hour Abuse-Deterrent      2. Prior Auth Status: Denied.  Reason: The criteria of \"medical Necessity\" under the patient's prescription benefits    3. Action Taken: Pharmacy Notified: yes Patient Notified: no    Notification letter from the insurance has been scanned into media.  "

## 2018-03-26 ENCOUNTER — APPOINTMENT (OUTPATIENT)
Dept: MEDICAL GROUP | Age: 68
End: 2018-03-26
Payer: COMMERCIAL

## 2018-03-31 ENCOUNTER — HOSPITAL ENCOUNTER (OUTPATIENT)
Dept: LAB | Facility: MEDICAL CENTER | Age: 68
End: 2018-03-31
Attending: FAMILY MEDICINE
Payer: COMMERCIAL

## 2018-03-31 DIAGNOSIS — E66.9 DIABETES MELLITUS TYPE 2 IN OBESE (HCC): ICD-10-CM

## 2018-03-31 DIAGNOSIS — E11.69 DIABETES MELLITUS TYPE 2 IN OBESE (HCC): ICD-10-CM

## 2018-03-31 LAB
ALBUMIN SERPL BCP-MCNC: 4.5 G/DL (ref 3.2–4.9)
ALBUMIN/GLOB SERPL: 1.8 G/DL
ALP SERPL-CCNC: 65 U/L (ref 30–99)
ALT SERPL-CCNC: 40 U/L (ref 2–50)
ANION GAP SERPL CALC-SCNC: 9 MMOL/L (ref 0–11.9)
AST SERPL-CCNC: 28 U/L (ref 12–45)
BASOPHILS # BLD AUTO: 0.6 % (ref 0–1.8)
BASOPHILS # BLD: 0.04 K/UL (ref 0–0.12)
BILIRUB SERPL-MCNC: 0.4 MG/DL (ref 0.1–1.5)
BUN SERPL-MCNC: 25 MG/DL (ref 8–22)
CALCIUM SERPL-MCNC: 9.7 MG/DL (ref 8.5–10.5)
CHLORIDE SERPL-SCNC: 102 MMOL/L (ref 96–112)
CHOLEST SERPL-MCNC: 111 MG/DL (ref 100–199)
CO2 SERPL-SCNC: 27 MMOL/L (ref 20–33)
CREAT SERPL-MCNC: 1.06 MG/DL (ref 0.5–1.4)
EOSINOPHIL # BLD AUTO: 0.28 K/UL (ref 0–0.51)
EOSINOPHIL NFR BLD: 4.3 % (ref 0–6.9)
ERYTHROCYTE [DISTWIDTH] IN BLOOD BY AUTOMATED COUNT: 41 FL (ref 35.9–50)
EST. AVERAGE GLUCOSE BLD GHB EST-MCNC: 206 MG/DL
GLOBULIN SER CALC-MCNC: 2.5 G/DL (ref 1.9–3.5)
GLUCOSE SERPL-MCNC: 158 MG/DL (ref 65–99)
HBA1C MFR BLD: 8.8 % (ref 0–5.6)
HCT VFR BLD AUTO: 45.1 % (ref 42–52)
HDLC SERPL-MCNC: 32 MG/DL
HGB BLD-MCNC: 15.4 G/DL (ref 14–18)
IMM GRANULOCYTES # BLD AUTO: 0.01 K/UL (ref 0–0.11)
IMM GRANULOCYTES NFR BLD AUTO: 0.2 % (ref 0–0.9)
LDLC SERPL CALC-MCNC: 40 MG/DL
LYMPHOCYTES # BLD AUTO: 1.4 K/UL (ref 1–4.8)
LYMPHOCYTES NFR BLD: 21.7 % (ref 22–41)
MCH RBC QN AUTO: 28.9 PG (ref 27–33)
MCHC RBC AUTO-ENTMCNC: 34.1 G/DL (ref 33.7–35.3)
MCV RBC AUTO: 84.6 FL (ref 81.4–97.8)
MONOCYTES # BLD AUTO: 0.74 K/UL (ref 0–0.85)
MONOCYTES NFR BLD AUTO: 11.5 % (ref 0–13.4)
NEUTROPHILS # BLD AUTO: 3.99 K/UL (ref 1.82–7.42)
NEUTROPHILS NFR BLD: 61.7 % (ref 44–72)
NRBC # BLD AUTO: 0 K/UL
NRBC BLD-RTO: 0 /100 WBC
PLATELET # BLD AUTO: 145 K/UL (ref 164–446)
PMV BLD AUTO: 10 FL (ref 9–12.9)
POTASSIUM SERPL-SCNC: 4 MMOL/L (ref 3.6–5.5)
PROT SERPL-MCNC: 7 G/DL (ref 6–8.2)
RBC # BLD AUTO: 5.33 M/UL (ref 4.7–6.1)
SODIUM SERPL-SCNC: 138 MMOL/L (ref 135–145)
TRIGL SERPL-MCNC: 193 MG/DL (ref 0–149)
WBC # BLD AUTO: 6.5 K/UL (ref 4.8–10.8)

## 2018-03-31 PROCEDURE — 83036 HEMOGLOBIN GLYCOSYLATED A1C: CPT

## 2018-03-31 PROCEDURE — 80053 COMPREHEN METABOLIC PANEL: CPT

## 2018-03-31 PROCEDURE — 85025 COMPLETE CBC W/AUTO DIFF WBC: CPT

## 2018-03-31 PROCEDURE — 80061 LIPID PANEL: CPT

## 2018-03-31 PROCEDURE — 36415 COLL VENOUS BLD VENIPUNCTURE: CPT

## 2018-04-03 ENCOUNTER — OFFICE VISIT (OUTPATIENT)
Dept: MEDICAL GROUP | Age: 68
End: 2018-04-03
Payer: COMMERCIAL

## 2018-04-03 VITALS
TEMPERATURE: 98.1 F | HEART RATE: 99 BPM | BODY MASS INDEX: 35.93 KG/M2 | OXYGEN SATURATION: 95 % | DIASTOLIC BLOOD PRESSURE: 80 MMHG | WEIGHT: 251 LBS | HEIGHT: 70 IN | SYSTOLIC BLOOD PRESSURE: 132 MMHG

## 2018-04-03 DIAGNOSIS — E11.69 DIABETES MELLITUS TYPE 2 IN OBESE (HCC): ICD-10-CM

## 2018-04-03 DIAGNOSIS — I10 ESSENTIAL HYPERTENSION: ICD-10-CM

## 2018-04-03 DIAGNOSIS — E66.9 OBESITY (BMI 30-39.9): ICD-10-CM

## 2018-04-03 DIAGNOSIS — E66.9 DIABETES MELLITUS TYPE 2 IN OBESE (HCC): ICD-10-CM

## 2018-04-03 PROCEDURE — 99214 OFFICE O/P EST MOD 30 MIN: CPT | Performed by: FAMILY MEDICINE

## 2018-04-03 RX ORDER — METOPROLOL SUCCINATE 100 MG/1
100 TABLET, EXTENDED RELEASE ORAL DAILY
Qty: 90 TAB | Refills: 0 | Status: SHIPPED | OUTPATIENT
Start: 2018-04-03 | End: 2018-07-17 | Stop reason: SDUPTHER

## 2018-04-03 NOTE — ASSESSMENT & PLAN NOTE
The patient is a 67-year-old poorly controlled diabetic. He initially presented to me with a hemoglobin A1c of 8.9, he had been taking Toujeo 54 units every morning. Along with Humalog 13-20 units 2 times a day. He refuses to see endocrinology.

## 2018-04-03 NOTE — ASSESSMENT & PLAN NOTE
The patient states he plans on becoming more active and he plans on losing 5-10 pounds the next 2 months. He states he has changes in diet by cutting out simple carbohydrates.

## 2018-04-03 NOTE — ASSESSMENT & PLAN NOTE
Hydrochlorothiazide 25 mg  Metoprolol 50 mg by mouth daily 24 hours  Felodipine 5 mg by mouth every 24 hours    Home BP log 127-153/  The patient has been tollerating the BP meds without any issues. No tunnel vision, no cough, no changes in vision, no lightheadedness, no fatigue, no syncopal or presyncopal episodes, no edema, no new rashes.

## 2018-04-03 NOTE — PROGRESS NOTES
This medical record contains text that has been entered with the assistance of computer voice recognition and dictation software.  Therefore, it may contain unintended errors in text, spelling, punctuation, or grammar    Chief Complaint   Patient presents with   • Hypertension     BP check       Yoan Ortega is a 67 y.o. male here evaluation and management of: DM, obesity, htn      HPI:     Insulin dependent diabetes mellitus (CMS-[facility])  The patient is a 67-year-old poorly controlled diabetic. He initially presented to me with a hemoglobin A1c of 8.9, he had been taking Toujeo 54 units every morning. Along with Humalog 13-20 units 2 times a day. He refuses to see endocrinology.     Obesity (BMI 30-39.9)  The patient states he plans on becoming more active and he plans on losing 5-10 pounds the next 2 months. He states he has changes in diet by cutting out simple carbohydrates.    Hypertension  Hydrochlorothiazide 25 mg  Metoprolol 50 mg by mouth daily 24 hours  Felodipine 5 mg by mouth every 24 hours    Home BP log 127-153/  The patient has been tollerating the BP meds without any issues. No tunnel vision, no cough, no changes in vision, no lightheadedness, no fatigue, no syncopal or presyncopal episodes, no edema, no new rashes.     Current medicines (including changes today)  Current Outpatient Prescriptions   Medication Sig Dispense Refill   • metformin (GLUCOPHAGE) 1000 MG tablet Take 1 Tab by mouth 2 times a day, with meals. 180 Tab 0   • metoprolol SR (TOPROL XL) 100 MG TABLET SR 24 HR Take 1 Tab by mouth every day. 90 Tab 0   • clopidogrel (PLAVIX) 75 MG Tab Take 1 Tab by mouth every day. 90 Tab 1   • insulin lispro (HUMALOG) 100 UNIT/ML Solution by Other route.     • Insulin Glargine 300 UNIT/ML Solution Pen-injector 54 Units by Subdermal route.     • ATENOLOL PO Take  by mouth.     • Insulin Pen Needle (PEN NEEDLES) 32G X 4 MM Misc      • ibuprofen (MOTRIN) 800 MG Tab      • potassium  chloride SA (K-DUR) 10 MEQ Tab CR Take 10 mEq by mouth 2 times a day.     • esomeprazole (NEXIUM) 20 MG capsule Take 20 mg by mouth every morning before breakfast.     • cyclobenzaprine (FLEXERIL) 10 MG Tab Take 10 mg by mouth 3 times a day as needed.     • Insulin Glargine (TOUJEO SOLOSTAR) 300 UNIT/ML Solution Pen-injector Inject 54 Units as instructed every morning. 1 PEN 2   • Lancets Misc Lancets order: Lancets for Abbott Tanana Lite meter. Sig: use bid and prn ssx high or low sugar. 100 Each 3   • Blood Glucose Monitoring Suppl Supplies Misc Test strips order: Test strips for Abbott Tanana Lite meter. Sig: use bid and prn ssx high or low sugar 100 Each 3   • oxyCODONE CR (OXYCONTIN) 10 MG Tablet Extended Release 12 hour Abuse-Deterrent Take 1 Tab by mouth every 24 hours as needed for up to 60 days. 30 Tab 0   • atorvastatin (LIPITOR) 80 MG tablet Take 1 Tab by mouth every day. 30 Tab 1   • losartan (COZAAR) 100 MG Tab Take 1 Tab by mouth every day. 90 Tab 3   • omeprazole (PRILOSEC) 20 MG delayed-release capsule Take 20 mg by mouth 1 time daily as needed.     • insulin lispro (HUMALOG) 100 UNIT/ML Solution Inject 13-22 Units as instructed 2 Times a Day. 22 units and 13 units in pm     • hydrochlorothiazide (MICROZIDE) 12.5 MG capsule Take 12.5 mg by mouth every day.     • Multiple Vitamins-Minerals (CENTRUM) Tab Take 1 Tab by mouth every day.     • Cholecalciferol (VITAMIN D PO) Take 1 Tab by mouth every day. 2000 units daily     • aspirin EC (ECOTRIN) 81 MG Tablet Delayed Response Take 81 mg by mouth every day.     • ALPRAZolam (XANAX) 1 MG Tab      • felodipine (PLENDIL) 5 MG TABLET SR 24 HR      • hydroCHLOROthiazide (HYDRODIURIL) 25 MG Tab      • potassium chloride (MICRO-K) 10 MEQ capsule      • HUMALOG KWIKPEN 100 UNIT/ML Solution Pen-injector injection      • clobetasol (TEMOVATE) 0.05 % Ointment AAA bid x 14 days 30 g 1   • alprazolam (XANAX) 0.5 MG Tab Take 0.5 mg by mouth 1 time daily as needed for  "Sleep.     • ibuprofen (MOTRIN) 200 MG Tab Take 800 mg by mouth 1 time daily as needed.       No current facility-administered medications for this visit.      He  has a past medical history of Abnormal cardiovascular function study (December 2016); Arthritis; Carotid stenosis, bilateral (January 2017); COPD (chronic obstructive pulmonary disease) (CMS-HCC); Diabetes (CMS-HCC); Heart burn; Hiatus hernia syndrome; Hyperlipidemia; Hypertension; Indigestion; Myocardial infarct; Snoring; and Stroke (CMS-HCC) (August 2016).  He  has a past surgical history that includes carotid endarterectomy (Right, 1/24/2017).  Social History   Substance Use Topics   • Smoking status: Never Smoker   • Smokeless tobacco: Never Used   • Alcohol use No     Social History     Social History Narrative   • No narrative on file     No family history on file.  No family status information on file.         ROS    Please see hpi     All other systems reviewed and are negative     Objective:     Blood pressure 132/80, pulse 99, temperature 36.7 °C (98.1 °F), height 1.778 m (5' 10\"), weight 113.9 kg (251 lb), SpO2 95 %. Body mass index is 36.01 kg/m².  Physical Exam:    Constitutional: Alert, no distress.  Skin: Warm, dry, good turgor, no rashes in visible areas.  Eye: Equal, round and reactive, conjunctiva clear, lids normal.  ENMT: Lips without lesions, good dentition, oropharynx clear.  Neck: Trachea midline, no masses, no thyromegaly. No cervical or supraclavicular lymphadenopathy.  Respiratory: Unlabored respiratory effort, lungs clear to auscultation, no wheezes, no ronchi.  Cardiovascular: Normal S1, S2, no murmur, no edema.  Abdomen: Soft, non-tender, no masses, no hepatosplenomegaly.  Psych: Alert and oriented x3, normal affect and mood.          Assessment and Plan:   The following treatment plan was discussed      1. Insulin dependent diabetes mellitus (CMS-HCC)      we discussed treat to target recommendations to self titrate the dose " of Long Acting Insulin(2 units every 3 days) to achieve fasting blood sugar in the morning in the range of     We discussed the importance of limiting carbohydrate intake  Advised to continue checking blood sugars twice a day    He again refused endocrinology consult, stating  He cannot afford it    - INSULIN FASTING; Future  - C-PEPTIDE; Future  - metformin (GLUCOPHAGE) 1000 MG tablet; Take 1 Tab by mouth 2 times a day, with meals.  Dispense: 180 Tab; Refill: 0  - HEMOGLOBIN A1C; Future  - COMP METABOLIC PANEL; Future      2. Obesity (BMI 30-39.9)      We discussed subsequent randomized trials, weight loss (via lifestyle or pharmacologic therapy) has been shown to reduce morbidity (reduction in risk factors for cardiovascular disease     The frequently cited Diabetes Prevention Program (DPP) significantly reduced the rate of progression from impaired glucose tolerance to diabetes over a three-year period in participants randomized to intensive lifestyle modification focusing on weight loss       We also discussed agressive lifestyle modifications with weight loss, aerobic exercise, and eating a prudent diet, which  included avoiding fried foods, using low-fat milk and avoiding simple carbohydrate, making a food diary. If you can't run because of pain do the stationary bike/elipticle or swim 30minutes 3 times per wk, in the beginning and then gradually increase.  - Patient identified as having weight management issue.  Appropriate orders and counseling given.    3. Essential hypertension  Increase metoprolol to 100mg xl  Patient was given instructions to make a two-week blood pressure log  To measure his blood pressure morning and evening same time  Then send results back to us  We will consider specific management at that time    - metoprolol SR (TOPROL XL) 100 MG TABLET SR 24 HR; Take 1 Tab by mouth every day.  Dispense: 90 Tab; Refill: 0        HEALTH MAINTENANCE:    Instructed to Follow up in clinic or ER  for worsening symptoms, difficulty breathing, lack of expected recovery, or should new symptoms or problems arise.    Followup: Return in about 2 months (around 6/3/2018) for Reevaluation.       Once again this medical record contains text that has been entered with the assistance of computer voice recognition and dictation software.  Therefore, it may contain unintended errors in text, spelling, punctuation, or grammar

## 2018-04-10 ENCOUNTER — TELEPHONE (OUTPATIENT)
Dept: MEDICAL GROUP | Age: 68
End: 2018-04-10

## 2018-04-10 NOTE — TELEPHONE ENCOUNTER
"1. Caller Name: Yoan Ortega                                         Call Back Number: [phone]      Patient approves a detailed voicemail message: yes    Pt called stating the new prescription he started (metformin 1000mg) caused his glucose to drop down to 69 last night and he \"almost passed out\".  Pt stated he took a \"sugar pill\" to bring his glucose back up to 89.  Pt has not taken medication today and would like to know what to do.  Pt is taking a lower dose of Hemalog before bed.  Pt is afraid of the possibility of coma and would like some clarification about how to manage his new medications.    "

## 2018-04-11 ENCOUNTER — TELEPHONE (OUTPATIENT)
Dept: MEDICAL GROUP | Age: 68
End: 2018-04-11

## 2018-04-11 RX ORDER — INSULIN LISPRO 100 [IU]/ML
28 INJECTION, SOLUTION INTRAVENOUS; SUBCUTANEOUS
Qty: 30 PEN | Refills: 1 | Status: SHIPPED | OUTPATIENT
Start: 2018-04-11 | End: 2018-08-29

## 2018-04-19 RX ORDER — HYDROCHLOROTHIAZIDE 25 MG/1
TABLET ORAL
Qty: 30 TAB | Refills: 0 | Status: CANCELLED | OUTPATIENT
Start: 2018-04-19

## 2018-04-23 RX ORDER — HYDROCHLOROTHIAZIDE 25 MG/1
TABLET ORAL
Qty: 30 TAB | Status: CANCELLED | OUTPATIENT
Start: 2018-04-23

## 2018-04-23 NOTE — TELEPHONE ENCOUNTER
----- Message from Siobhan Lucas sent at 4/23/2018  1:17 PM PDT -----  Regarding: FW: Refill  Contact: [phone]      ----- Message -----  From: Yoan Ortega  Sent: 4/23/2018  11:27 AM  To: Laurence Coughlin  Subject: Refill                                           Topic: Technical Quality    Can you please refill my hydrochlorothiazide  25 mg   [id number]

## 2018-04-24 RX ORDER — HYDROCHLOROTHIAZIDE 25 MG/1
25 TABLET ORAL DAILY
Qty: 90 TAB | Refills: 1 | Status: SHIPPED | OUTPATIENT
Start: 2018-04-24 | End: 2019-02-01

## 2018-05-16 DIAGNOSIS — E11.69 DIABETES MELLITUS TYPE 2 IN OBESE (HCC): ICD-10-CM

## 2018-05-16 DIAGNOSIS — E66.9 DIABETES MELLITUS TYPE 2 IN OBESE (HCC): ICD-10-CM

## 2018-05-21 ENCOUNTER — TELEPHONE (OUTPATIENT)
Dept: MEDICAL GROUP | Age: 68
End: 2018-05-21

## 2018-05-21 NOTE — TELEPHONE ENCOUNTER
"2. insurance paperwork received from [facility] requiring provider signature.     3. All appropriate fields completed by Medical Assistant: N/A CMA printed and distributed to MA    4. Paperwork placed in \"MA to Provider\" folder/basket. Awaiting provider completion/signature.    "

## 2018-05-22 DIAGNOSIS — F41.9 ANXIETY: ICD-10-CM

## 2018-06-02 ENCOUNTER — HOSPITAL ENCOUNTER (OUTPATIENT)
Dept: LAB | Facility: MEDICAL CENTER | Age: 68
End: 2018-06-02
Attending: FAMILY MEDICINE
Payer: COMMERCIAL

## 2018-06-02 DIAGNOSIS — E66.9 DIABETES MELLITUS TYPE 2 IN OBESE (HCC): ICD-10-CM

## 2018-06-02 DIAGNOSIS — E11.69 DIABETES MELLITUS TYPE 2 IN OBESE (HCC): ICD-10-CM

## 2018-06-02 LAB
ALBUMIN SERPL BCP-MCNC: 4.2 G/DL (ref 3.2–4.9)
ALBUMIN/GLOB SERPL: 1.4 G/DL
ALP SERPL-CCNC: 59 U/L (ref 30–99)
ALT SERPL-CCNC: 38 U/L (ref 2–50)
ANION GAP SERPL CALC-SCNC: 10 MMOL/L (ref 0–11.9)
AST SERPL-CCNC: 28 U/L (ref 12–45)
BILIRUB SERPL-MCNC: 0.5 MG/DL (ref 0.1–1.5)
BUN SERPL-MCNC: 17 MG/DL (ref 8–22)
CALCIUM SERPL-MCNC: 9.6 MG/DL (ref 8.5–10.5)
CHLORIDE SERPL-SCNC: 103 MMOL/L (ref 96–112)
CO2 SERPL-SCNC: 24 MMOL/L (ref 20–33)
CREAT SERPL-MCNC: 1.07 MG/DL (ref 0.5–1.4)
EST. AVERAGE GLUCOSE BLD GHB EST-MCNC: 148 MG/DL
GLOBULIN SER CALC-MCNC: 2.9 G/DL (ref 1.9–3.5)
GLUCOSE SERPL-MCNC: 129 MG/DL (ref 65–99)
HBA1C MFR BLD: 6.8 % (ref 0–5.6)
POTASSIUM SERPL-SCNC: 3.9 MMOL/L (ref 3.6–5.5)
PROT SERPL-MCNC: 7.1 G/DL (ref 6–8.2)
SODIUM SERPL-SCNC: 137 MMOL/L (ref 135–145)

## 2018-06-02 PROCEDURE — 83525 ASSAY OF INSULIN: CPT

## 2018-06-02 PROCEDURE — 36415 COLL VENOUS BLD VENIPUNCTURE: CPT

## 2018-06-02 PROCEDURE — 80053 COMPREHEN METABOLIC PANEL: CPT

## 2018-06-02 PROCEDURE — 84681 ASSAY OF C-PEPTIDE: CPT

## 2018-06-02 PROCEDURE — 83036 HEMOGLOBIN GLYCOSYLATED A1C: CPT

## 2018-06-04 LAB
C PEPTIDE SERPL-MCNC: 2.6 NG/ML (ref 0.8–3.5)
INSULIN P FAST SERPL-ACNC: 41 UIU/ML (ref 3–19)

## 2018-06-06 ENCOUNTER — OFFICE VISIT (OUTPATIENT)
Dept: MEDICAL GROUP | Age: 68
End: 2018-06-06
Payer: COMMERCIAL

## 2018-06-06 VITALS
TEMPERATURE: 98.6 F | DIASTOLIC BLOOD PRESSURE: 64 MMHG | WEIGHT: 246 LBS | BODY MASS INDEX: 35.22 KG/M2 | HEIGHT: 70 IN | SYSTOLIC BLOOD PRESSURE: 120 MMHG | OXYGEN SATURATION: 96 % | HEART RATE: 78 BPM

## 2018-06-06 DIAGNOSIS — F51.01 PRIMARY INSOMNIA: ICD-10-CM

## 2018-06-06 DIAGNOSIS — E78.00 PURE HYPERCHOLESTEROLEMIA: ICD-10-CM

## 2018-06-06 DIAGNOSIS — E66.9 DIABETES MELLITUS TYPE 2 IN OBESE (HCC): ICD-10-CM

## 2018-06-06 DIAGNOSIS — I10 ESSENTIAL HYPERTENSION: ICD-10-CM

## 2018-06-06 DIAGNOSIS — E11.69 DIABETES MELLITUS TYPE 2 IN OBESE (HCC): ICD-10-CM

## 2018-06-06 DIAGNOSIS — N40.1 BENIGN PROSTATIC HYPERPLASIA WITH LOWER URINARY TRACT SYMPTOMS, SYMPTOM DETAILS UNSPECIFIED: ICD-10-CM

## 2018-06-06 PROCEDURE — 99214 OFFICE O/P EST MOD 30 MIN: CPT | Performed by: FAMILY MEDICINE

## 2018-06-06 RX ORDER — TEMAZEPAM 30 MG/1
30 CAPSULE ORAL NIGHTLY PRN
Qty: 30 CAP | Refills: 0 | Status: SHIPPED | OUTPATIENT
Start: 2018-06-06 | End: 2018-07-24 | Stop reason: SDUPTHER

## 2018-06-06 RX ORDER — INSULIN GLARGINE 100 [IU]/ML
INJECTION, SOLUTION SUBCUTANEOUS
Qty: 10 ML | Refills: 9 | Status: SHIPPED | OUTPATIENT
Start: 2018-06-06 | End: 2019-02-01

## 2018-06-06 RX ORDER — TAMSULOSIN HYDROCHLORIDE 0.4 MG/1
0.4 CAPSULE ORAL
Qty: 90 CAP | Refills: 2 | Status: SHIPPED | OUTPATIENT
Start: 2018-06-06 | End: 2018-08-17 | Stop reason: SDUPTHER

## 2018-06-07 NOTE — ASSESSMENT & PLAN NOTE
The patient states that he has to get at least 2 or 3 times at night to urinate.     No hesistancy, no dysuria, is able to maintain urinary stream, no spliting or spraying of urinary stream, no terminal dribbling.

## 2018-06-07 NOTE — ASSESSMENT & PLAN NOTE
Losartan 100 mg p.o. Daily  Hydrochlorothiazide 25 mg p.o. Daily  Metoprolol 100 mg p.o. Daily  Felodipine 5 mg p.o. Daily    The patient has been tollerating the BP meds without any issues. No tunnel vision, no cough, no changes in vision, no lightheadedness, no fatigue, no syncopal or presyncopal episodes, no edema, no new rashes.     The patient also denies chest pain, no dyspnea on exertion, no headaches, no numbness or tingling, no changes in vision, no weakness in any extremity, no back pain no changes in micturition.

## 2018-06-07 NOTE — PROGRESS NOTES
This medical record contains text that has been entered with the assistance of computer voice recognition and dictation software.  Therefore, it may contain unintended errors in text, spelling, punctuation, or grammar    Chief Complaint   Patient presents with   • Results     lab review          Porter Ortega is a 67 y.o. male here evaluation and management of: Hypertension, BPH, diabetes mellitus, hyperlipidemia      HPI:     Diabetes mellitus type 2 in obese (CMS-[facility])    Toujeo 64 units Qam   Humulog 28 units Qam  No longer nightly insulin  Metformin 1000mg po bid    The patient denied any polyuria, no polydipsia, no polyphagia, no weight loss, no numbness or tingling anywhere, no change in vision.    The patient is on an ARB, aspirin and a statin, also checks feet meticulously daily for ulcerations.    Retinal screening  overdue  microalbumin  Up to date  Up to date on vaccinations       Results for PORTER ORTEGA (MRN [id number]) as of 6/6/2018 16:28   Ref. Range 3/31/2018 07:11 6/2/2018 07:26   Glycohemoglobin Latest Ref Range: 0.0 - 5.6 % 8.8 (H) 6.8 (H)   Estim. Avg Glu Latest Units: mg/dL 206 148       Benign prostatic hyperplasia with lower urinary tract symptoms  The patient states that he has to get at least 2 or 3 times at night to urinate.     No hesistancy, no dysuria, is able to maintain urinary stream, no spliting or spraying of urinary stream, no terminal dribbling.          Hypertension  Losartan 100 mg p.o. Daily  Hydrochlorothiazide 25 mg p.o. Daily  Metoprolol 100 mg p.o. Daily  Felodipine 5 mg p.o. Daily    The patient has been tollerating the BP meds without any issues. No tunnel vision, no cough, no changes in vision, no lightheadedness, no fatigue, no syncopal or presyncopal episodes, no edema, no new rashes.     The patient also denies chest pain, no dyspnea on exertion, no headaches, no numbness or tingling, no changes in vision, no weakness in any extremity, no back pain no  changes in micturition.        Hyperlipidemia  Atorvastatin 80 mg p.o. Nightly    The patient has been on a statin for years and tolerating this fine. The patient denies any muscle aches, no abdominal pain and no history of elevated liver enzymes.    Results for PORTER DEGROOT (MRN [id number]) as of 6/7/2018 09:12   Ref. Range 3/31/2018 07:11   Cholesterol,Tot Latest Ref Range: 100 - 199 mg/dL 111   Triglycerides Latest Ref Range: 0 - 149 mg/dL 193 (H)   HDL Latest Ref Range: >=40 mg/dL 32 (A)   LDL Latest Ref Range: <100 mg/dL 40     Current medicines (including changes today)  Current Outpatient Prescriptions   Medication Sig Dispense Refill   • insulin glargine (LANTUS) 100 UNIT/ML Solution Inject 64 units IM qAM 10 mL 9   • temazepam (RESTORIL) 30 MG capsule Take 1 Cap by mouth at bedtime as needed for Sleep for up to 30 days. 30 Cap 0   • tamsulosin (FLOMAX) 0.4 MG capsule Take 1 Cap by mouth ONE-HALF HOUR AFTER BREAKFAST. 90 Cap 2   • ALPRAZolam (XANAX) 1 MG Tab Take 1 Tab by mouth at bedtime as needed for Sleep for up to 90 days. 30 Tab 2   • hydroCHLOROthiazide (HYDRODIURIL) 25 MG Tab Take 1 Tab by mouth every day. 90 Tab 1   • HUMALOG KWIKPEN 100 UNIT/ML Solution Pen-injector injection Inject 28 Units as instructed every morning before breakfast. Inject 28 units subcutaneously before breakfast and 18 units before dinner 30 PEN 1   • metformin (GLUCOPHAGE) 1000 MG tablet Take 1 Tab by mouth 2 times a day, with meals. 180 Tab 0   • metoprolol SR (TOPROL XL) 100 MG TABLET SR 24 HR Take 1 Tab by mouth every day. 90 Tab 0   • clopidogrel (PLAVIX) 75 MG Tab Take 1 Tab by mouth every day. 90 Tab 1   • insulin lispro (HUMALOG) 100 UNIT/ML Solution by Other route.     • Insulin Glargine 300 UNIT/ML Solution Pen-injector 54 Units by Subdermal route.     • potassium chloride (MICRO-K) 10 MEQ capsule      • Insulin Pen Needle (PEN NEEDLES) 32G X 4 MM Misc      • ibuprofen (MOTRIN) 800 MG Tab      • potassium  chloride SA (K-DUR) 10 MEQ Tab CR Take 10 mEq by mouth 2 times a day.     • esomeprazole (NEXIUM) 20 MG capsule Take 20 mg by mouth every morning before breakfast.     • cyclobenzaprine (FLEXERIL) 10 MG Tab Take 10 mg by mouth 3 times a day as needed.     • clobetasol (TEMOVATE) 0.05 % Ointment AAA bid x 14 days 30 g 1   • Lancets Misc Lancets order: Lancets for Abbott Diana Lite meter. Sig: use bid and prn ssx high or low sugar. 100 Each 3   • Blood Glucose Monitoring Suppl Supplies Misc Test strips order: Test strips for Abbott Diana Lite meter. Sig: use bid and prn ssx high or low sugar 100 Each 3   • atorvastatin (LIPITOR) 80 MG tablet Take 1 Tab by mouth every day. 30 Tab 1   • losartan (COZAAR) 100 MG Tab Take 1 Tab by mouth every day. 90 Tab 3   • omeprazole (PRILOSEC) 20 MG delayed-release capsule Take 20 mg by mouth 1 time daily as needed.     • insulin lispro (HUMALOG) 100 UNIT/ML Solution Inject 13-22 Units as instructed 2 Times a Day. 22 units and 13 units in pm     • alprazolam (XANAX) 0.5 MG Tab Take 0.5 mg by mouth 1 time daily as needed for Sleep.     • ibuprofen (MOTRIN) 200 MG Tab Take 800 mg by mouth 1 time daily as needed.     • Multiple Vitamins-Minerals (CENTRUM) Tab Take 1 Tab by mouth every day.     • Cholecalciferol (VITAMIN D PO) Take 1 Tab by mouth every day. 2000 units daily     • aspirin EC (ECOTRIN) 81 MG Tablet Delayed Response Take 81 mg by mouth every day.     • felodipine (PLENDIL) 5 MG TABLET SR 24 HR        No current facility-administered medications for this visit.      He  has a past medical history of Abnormal cardiovascular function study (December 2016); Arthritis; Carotid stenosis, bilateral (January 2017); COPD (chronic obstructive pulmonary disease) ([facility]); Diabetes ([facility]); Heart burn; Hiatus hernia syndrome; Hyperlipidemia; Hypertension; Indigestion; Myocardial infarct ([facility]); Snoring; and Stroke ([facility]) (August 2016).  He  has a past surgical history that includes  "carotid endarterectomy (Right, 1/24/2017).  Social History   Substance Use Topics   • Smoking status: Never Smoker   • Smokeless tobacco: Never Used   • Alcohol use No     Social History     Social History Narrative   • No narrative on file     History reviewed. No pertinent family history.  No family status information on file.         ROS    Please see hpi     All other systems reviewed and are negative     Objective:     Blood pressure 120/64, pulse 78, temperature 37 °C (98.6 °F), height 1.778 m (5' 10\"), weight 111.6 kg (246 lb), SpO2 96 %. Body mass index is 35.3 kg/m².  Physical Exam:    Constitutional: Alert, no distress.  Skin: Warm, dry, good turgor, no rashes in visible areas.  Eye: Equal, round and reactive, conjunctiva clear, lids normal.  ENMT: Lips without lesions, good dentition, oropharynx clear.  Neck: Trachea midline, no masses, no thyromegaly. No cervical or supraclavicular lymphadenopathy.  Respiratory: Unlabored respiratory effort, lungs clear to auscultation, no wheezes, no ronchi.  Cardiovascular: Normal S1, S2, no murmur, no edema.  Abdomen: Soft, non-tender, no masses, no hepatosplenomegaly.  Psych: Alert and oriented x3, normal affect and mood.          Assessment and Plan:   The following treatment plan was discussed      1. Diabetes mellitus type 2 in obese (HCC)    At goal    The patient was counseled on the following  1. meticulous bilateral feet inspection daily  2. yearly dilated eye exams,   3. Wt. loss of 5% will decrease insulin resistance follow a low-fat diet and to begin an exercise program  4.  Pt. is on baby aspirin  5. Patient  is on an ARB will check microalbumin yearly  6. BP goal is ,130/80, and LDL goal is <70  7. Hba1c goal is less than 7  8. Recommend cmp and A1C evaluation every 3 months  9. Check blood sugar twice daily  and minimum once daily at various times  10. Also needs diabetic education  11. Recommend vaccinations: Yearly Flu, Pneumvox, and hepatitis B " vaccine  12. Yearly dental exam  13. Patient is on a Statin      2. Primary insomnia    We talked about the addictive nature of this medication and all side effects including but not limited to respiratory depression and death. We also reviewed of the association of chronic benzodiazepine use and Alzheimer's disease  Would like to continue his medication. Also recommended to avoid taking this daily as   They will develop tolerance leaving the effect minimal.    - temazepam (RESTORIL) 30 MG capsule; Take 1 Cap by mouth at bedtime as needed for Sleep for up to 30 days.  Dispense: 30 Cap; Refill: 0    3. Benign prostatic hyperplasia with lower urinary tract symptoms, symptom details unspecified      - tamsulosin (FLOMAX) 0.4 MG capsule; Take 1 Cap by mouth ONE-HALF HOUR AFTER BREAKFAST.  Dispense: 90 Cap; Refill: 2    4. Essential hypertension  Patient has been stable with current management  We will make no changes for now          HEALTH MAINTENANCE:    Instructed to Follow up in clinic or ER for worsening symptoms, difficulty breathing, lack of expected recovery, or should new symptoms or problems arise.    Followup: Return in about 6 months (around 12/6/2018) for Reevaluation.       Once again this medical record contains text that has been entered with the assistance of computer voice recognition and dictation software.  Therefore, it may contain unintended errors in text, spelling, punctuation, or grammar

## 2018-06-07 NOTE — ASSESSMENT & PLAN NOTE
Atorvastatin 80 mg p.o. Nightly    The patient has been on a statin for years and tolerating this fine. The patient denies any muscle aches, no abdominal pain and no history of elevated liver enzymes.    Results for PORTER DEGROOT (MRN [id number]) as of 6/7/2018 09:12   Ref. Range 3/31/2018 07:11   Cholesterol,Tot Latest Ref Range: 100 - 199 mg/dL 111   Triglycerides Latest Ref Range: 0 - 149 mg/dL 193 (H)   HDL Latest Ref Range: >=40 mg/dL 32 (A)   LDL Latest Ref Range: <100 mg/dL 40

## 2018-06-08 ENCOUNTER — TELEPHONE (OUTPATIENT)
Dept: MEDICAL GROUP | Age: 68
End: 2018-06-08

## 2018-06-08 NOTE — TELEPHONE ENCOUNTER
DOCUMENTATION OF PAR STATUS:    1. Name of Medication & Dose: Temazepam 30 mg cap     2. Name of Prescription Coverage Company & phone #: [employer]    3. Date Prior Auth Submitted: 06/08/2018    4. What information was given to obtain insurance decision? Primary insomnia     5. Prior Auth Status? Pending    6. Patient Notified: no

## 2018-06-13 ENCOUNTER — TELEPHONE (OUTPATIENT)
Dept: MEDICAL GROUP | Age: 68
End: 2018-06-13

## 2018-06-13 NOTE — TELEPHONE ENCOUNTER
Phone Number Called: [phone] (home)       Message: please call office to discuss medication azeb    Left Message for patient to call back: yes

## 2018-06-20 ENCOUNTER — TELEPHONE (OUTPATIENT)
Dept: MEDICAL GROUP | Age: 68
End: 2018-06-20

## 2018-06-20 NOTE — TELEPHONE ENCOUNTER
1. Caller Name: Yoan Ortega        Pt would like insulin glargine (LANTUS) 100 UNIT/ML pens, not the syringes and vials.

## 2018-06-28 DIAGNOSIS — I10 ESSENTIAL HYPERTENSION: ICD-10-CM

## 2018-06-28 RX ORDER — POTASSIUM CHLORIDE 750 MG/1
10 CAPSULE, EXTENDED RELEASE ORAL 2 TIMES DAILY
Qty: 180 CAP | Refills: 0 | Status: SHIPPED | OUTPATIENT
Start: 2018-06-28 | End: 2018-11-15 | Stop reason: SDUPTHER

## 2018-07-09 DIAGNOSIS — E11.69 DIABETES MELLITUS TYPE 2 IN OBESE (HCC): ICD-10-CM

## 2018-07-09 DIAGNOSIS — E66.9 DIABETES MELLITUS TYPE 2 IN OBESE (HCC): ICD-10-CM

## 2018-07-09 NOTE — TELEPHONE ENCOUNTER
From: Yoan Ortega  Sent: 7/9/2018 10:45 AM PDT  Subject: Medication Renewal Request    Yoan Ortega would like a refill of the following medications:     metformin (GLUCOPHAGE) 1000 MG tablet [Alejandro Michelle M.D.]    Preferred pharmacy: [id number] - [place], [place] - [address] [place] [address]

## 2018-07-16 DIAGNOSIS — I10 ESSENTIAL HYPERTENSION: ICD-10-CM

## 2018-07-17 DIAGNOSIS — I10 ESSENTIAL HYPERTENSION: ICD-10-CM

## 2018-07-17 RX ORDER — ATORVASTATIN CALCIUM 80 MG/1
80 TABLET, FILM COATED ORAL DAILY
Qty: 90 TAB | Refills: 1 | Status: SHIPPED | OUTPATIENT
Start: 2018-07-17 | End: 2019-01-30 | Stop reason: SDUPTHER

## 2018-07-18 RX ORDER — METOPROLOL SUCCINATE 100 MG/1
100 TABLET, EXTENDED RELEASE ORAL DAILY
Qty: 90 TAB | Refills: 0 | Status: SHIPPED | OUTPATIENT
Start: 2018-07-18 | End: 2018-11-15 | Stop reason: SDUPTHER

## 2018-08-04 ENCOUNTER — PATIENT MESSAGE (OUTPATIENT)
Dept: MEDICAL GROUP | Age: 68
End: 2018-08-04

## 2018-08-04 DIAGNOSIS — F41.9 ANXIETY: ICD-10-CM

## 2018-08-07 RX ORDER — ALPRAZOLAM 1 MG/1
1 TABLET ORAL NIGHTLY PRN
Qty: 30 TAB | Refills: 0 | Status: SHIPPED
Start: 2018-08-07 | End: 2018-08-29 | Stop reason: SDUPTHER

## 2018-08-17 DIAGNOSIS — N40.1 BENIGN PROSTATIC HYPERPLASIA WITH LOWER URINARY TRACT SYMPTOMS, SYMPTOM DETAILS UNSPECIFIED: ICD-10-CM

## 2018-08-17 RX ORDER — TAMSULOSIN HYDROCHLORIDE 0.4 MG/1
0.4 CAPSULE ORAL
Qty: 90 CAP | Refills: 2 | Status: CANCELLED | OUTPATIENT
Start: 2018-08-17

## 2018-08-17 RX ORDER — TAMSULOSIN HYDROCHLORIDE 0.4 MG/1
0.4 CAPSULE ORAL
Qty: 90 CAP | Refills: 0 | Status: SHIPPED | OUTPATIENT
Start: 2018-08-17 | End: 2018-09-07

## 2018-08-20 DIAGNOSIS — F51.01 PRIMARY INSOMNIA: ICD-10-CM

## 2018-08-21 RX ORDER — TEMAZEPAM 30 MG/1
30 CAPSULE ORAL NIGHTLY PRN
Qty: 30 CAP | Refills: 0 | Status: SHIPPED
Start: 2018-08-21 | End: 2018-08-29

## 2018-08-29 ENCOUNTER — OFFICE VISIT (OUTPATIENT)
Dept: MEDICAL GROUP | Age: 68
End: 2018-08-29
Payer: COMMERCIAL

## 2018-08-29 VITALS
WEIGHT: 242 LBS | OXYGEN SATURATION: 96 % | TEMPERATURE: 97.7 F | SYSTOLIC BLOOD PRESSURE: 104 MMHG | BODY MASS INDEX: 34.65 KG/M2 | DIASTOLIC BLOOD PRESSURE: 62 MMHG | HEIGHT: 70 IN | HEART RATE: 75 BPM

## 2018-08-29 DIAGNOSIS — F51.01 PRIMARY INSOMNIA: ICD-10-CM

## 2018-08-29 DIAGNOSIS — E66.9 OBESITY (BMI 30-39.9): ICD-10-CM

## 2018-08-29 DIAGNOSIS — F41.9 ANXIETY: ICD-10-CM

## 2018-08-29 PROCEDURE — 99214 OFFICE O/P EST MOD 30 MIN: CPT | Performed by: FAMILY MEDICINE

## 2018-08-29 RX ORDER — ALPRAZOLAM 1 MG/1
1 TABLET ORAL NIGHTLY PRN
Qty: 30 TAB | Refills: 0 | Status: SHIPPED | OUTPATIENT
Start: 2018-08-29 | End: 2018-08-29 | Stop reason: SDUPTHER

## 2018-08-29 RX ORDER — ALPRAZOLAM 1 MG/1
1 TABLET ORAL NIGHTLY PRN
Qty: 90 TAB | Refills: 0 | Status: SHIPPED | OUTPATIENT
Start: 2018-08-29 | End: 2018-12-17 | Stop reason: SDUPTHER

## 2018-08-29 NOTE — PROGRESS NOTES
This medical record contains text that has been entered with the assistance of computer voice recognition and dictation software.  Therefore, it may contain unintended errors in text, spelling, punctuation, or grammar    Chief Complaint   Patient presents with   • Medication Refill         Yoan Ortega is a 68 y.o. male here evaluation and management of: DM, insomnia, obesity      HPI:     Insulin dependent diabetes mellitus (HCC)  Toujeo 64 units Qam   Humulog 28 units Qam  No longer nightly insulin  Metformin 1000mg po bid      HBA1c decreased from 8.9 to 6.6    The patient denies  any polydipsia, polyuria, polyphagia, weight loss, no numbness or tingling anywhere, no changes in vision, no ulcerations or poor healing wounds.         Primary insomnia  Xanax 1mg po qhs    Patient states that this allows him to get adequate rest and feel better the next day as well.  He wakes up feeling punished.  Denies any excessive use no unexpected overdoses.    Obesity (BMI 30-39.9)  The patient has lost weight since April he faxed lost 9 pounds through diet and exercise. denies any new fatigue, cold/heat intolerance, weight gain/weight loss, diarrhea/constipation, dry skin, myalgia, depressed mood, palpitations, tremmor, hair loss, and no goiter.      Current medicines (including changes today)  Current Outpatient Prescriptions   Medication Sig Dispense Refill   • ALPRAZolam (XANAX) 1 MG Tab Take 1 Tab by mouth at bedtime as needed for Sleep for up to 90 days. 90 Tab 0   • tamsulosin (FLOMAX) 0.4 MG capsule Take 1 Cap by mouth ONE-HALF HOUR AFTER BREAKFAST. 90 Cap 0   • metoprolol SR (TOPROL XL) 100 MG TABLET SR 24 HR Take 1 Tab by mouth every day. 90 Tab 0   • atorvastatin (LIPITOR) 80 MG tablet Take 1 Tab by mouth every day. 90 Tab 1   • metformin (GLUCOPHAGE) 1000 MG tablet Take 1 Tab by mouth 2 times a day, with meals. 180 Tab 0   • potassium chloride (MICRO-K) 10 MEQ capsule Take 1 Cap by mouth 2 times a day. 180  Cap 0   • insulin glargine (LANTUS) 100 UNIT/ML Solution Inject 64 units IM qAM 10 mL 9   • hydroCHLOROthiazide (HYDRODIURIL) 25 MG Tab Take 1 Tab by mouth every day. 90 Tab 1   • clopidogrel (PLAVIX) 75 MG Tab Take 1 Tab by mouth every day. 90 Tab 1   • insulin lispro (HUMALOG) 100 UNIT/ML Solution by Other route.     • Insulin Glargine 300 UNIT/ML Solution Pen-injector 54 Units by Subdermal route.     • Insulin Pen Needle (PEN NEEDLES) 32G X 4 MM Misc      • ibuprofen (MOTRIN) 800 MG Tab      • potassium chloride SA (K-DUR) 10 MEQ Tab CR Take 10 mEq by mouth 2 times a day.     • esomeprazole (NEXIUM) 20 MG capsule Take 20 mg by mouth every morning before breakfast.     • cyclobenzaprine (FLEXERIL) 10 MG Tab Take 10 mg by mouth 3 times a day as needed.     • clobetasol (TEMOVATE) 0.05 % Ointment AAA bid x 14 days 30 g 1   • Lancets Misc Lancets order: Lancets for [employer] [place] Lite meter. Sig: use bid and prn ssx high or low sugar. 100 Each 3   • Blood Glucose Monitoring Suppl Supplies Misc Test strips order: Test strips for [employer] [place] Lite meter. Sig: use bid and prn ssx high or low sugar 100 Each 3   • losartan (COZAAR) 100 MG Tab Take 1 Tab by mouth every day. 90 Tab 3   • omeprazole (PRILOSEC) 20 MG delayed-release capsule Take 20 mg by mouth 1 time daily as needed.     • insulin lispro (HUMALOG) 100 UNIT/ML Solution Inject 13-22 Units as instructed 2 Times a Day. 22 units and 13 units in pm     • ibuprofen (MOTRIN) 200 MG Tab Take 800 mg by mouth 1 time daily as needed.     • Multiple Vitamins-Minerals (CENTRUM) Tab Take 1 Tab by mouth every day.     • Cholecalciferol (VITAMIN D PO) Take 1 Tab by mouth every day. 2000 units daily     • aspirin EC (ECOTRIN) 81 MG Tablet Delayed Response Take 81 mg by mouth every day.     • insulin glargine (LANTUS) 100 UNIT/ML Solution Pen-injector injection Inject 54 units subdermal Qam 30 PEN 3   • felodipine (PLENDIL) 5 MG TABLET SR 24 HR        No current  "facility-administered medications for this visit.      He  has a past medical history of Abnormal cardiovascular function study (December 2016); Arthritis; Carotid stenosis, bilateral (January 2017); COPD (chronic obstructive pulmonary disease) (HCC); Diabetes (HCC); Heart burn; Hiatus hernia syndrome; Hyperlipidemia; Hypertension; Indigestion; Myocardial infarct (HCC); Snoring; and Stroke (HCC) (August 2016).  He  has a past surgical history that includes carotid endarterectomy (Right, 1/24/2017).  Social History   Substance Use Topics   • Smoking status: Never Smoker   • Smokeless tobacco: Never Used   • Alcohol use No     Social History     Social History Narrative   • No narrative on file     No family history on file.  No family status information on file.         ROS    Please see hpi     All other systems reviewed and are negative     Objective:     Blood pressure 104/62, pulse 75, temperature 36.5 °C (97.7 °F), height 1.778 m (5' 10\"), weight 109.8 kg (242 lb), SpO2 96 %. Body mass index is 34.72 kg/m².  Physical Exam:    Constitutional: Alert, no distress.  Skin: Warm, dry, good turgor, no rashes in visible areas.  Eye: Equal, round and reactive, conjunctiva clear, lids normal.  ENMT: Lips without lesions, good dentition, oropharynx clear.  Neck: Trachea midline, no masses, no thyromegaly. No cervical or supraclavicular lymphadenopathy.  Respiratory: Unlabored respiratory effort, lungs clear to auscultation, no wheezes, no ronchi.  Cardiovascular: Normal S1, S2, no murmur, no edema.  Abdomen: Soft, non-tender, no masses, no hepatosplenomegaly.  Psych: Alert and oriented x3, normal affect and mood.          Assessment and Plan:   The following treatment plan was discussed      1. Anxiety    We talked about the addictive nature of this medication and all side effects including but not limited to respiratory depression and death. We also reviewed of the association of chronic benzodiazepine use and Alzheimer's " disease  Would like to continue his medication. Also recommended to avoid taking this daily as   They will develop tolerance leaving the effect minimal.    - ALPRAZolam (XANAX) 1 MG Tab; Take 1 Tab by mouth at bedtime as needed for Sleep for up to 90 days.  Dispense: 90 Tab; Refill: 0    2. Insulin dependent diabetes mellitus (HCC)  At goal    The patient was counseled on the following  1. meticulous bilateral feet inspection daily  2. yearly dilated eye exams,   3. Wt. loss of 5% will decrease insulin resistance follow a low-fat diet and to begin an exercise program  4.  Pt. is on baby aspirin  5. Patient  is on an ace inhibitor will check microalbumin yearly  6. BP goal is ,130/80, and LDL goal is <70  7. Hba1c goal is less than 7  8. Recommend cmp and A1C evaluation every 3 months  9. Check blood sugar twice daily  and minimum once daily at various times  10. Also needs diabetic education  11. Recommend vaccinations: Yearly Flu, Pneumvox, and hepatitis B vaccine  12. Yearly dental exam  13. Patient is on a Statin      3. Primary insomnia  Patient has been stable with current management  We will make no changes for now      4. Obesity (BMI 30-39.9)    We discussed subsequent randomized trials, weight loss (via lifestyle or pharmacologic therapy) has been shown to reduce morbidity (reduction in risk factors for cardiovascular disease     The frequently cited Diabetes Prevention Program (DPP) significantly reduced the rate of progression from impaired glucose tolerance to diabetes over a three-year period in participants randomized to intensive lifestyle modification focusing on weight loss       We also discussed agressive lifestyle modifications with weight loss, aerobic exercise, and eating a prudent diet, which  included avoiding fried foods, using low-fat milk and avoiding simple carbohydrate, making a food diary. If you can't run because of pain do the stationary bike/elipticle or swim 30minutes 3 times per  wk, in the beginning and then gradually increase.          HEALTH MAINTENANCE:    Instructed to Follow up in clinic or ER for worsening symptoms, difficulty breathing, lack of expected recovery, or should new symptoms or problems arise.    Followup: Return in about 6 months (around 2/28/2019) for Reevaluation, labs.       Once again this medical record contains text that has been entered with the assistance of computer voice recognition and dictation software.  Therefore, it may contain unintended errors in text, spelling, punctuation, or grammar

## 2018-08-29 NOTE — ASSESSMENT & PLAN NOTE
Toujeo 64 units Qam   Destiny 28 units Qam  No longer nightly insulin  Metformin 1000mg po bid      HBA1c decreased from 8.9 to 6.6    The patient denies  any polydipsia, polyuria, polyphagia, weight loss, no numbness or tingling anywhere, no changes in vision, no ulcerations or poor healing wounds.

## 2018-08-29 NOTE — ASSESSMENT & PLAN NOTE
Xanax 1mg po qhs    Patient states that this allows him to get adequate rest and feel better the next day as well.  He wakes up feeling punished.  Denies any excessive use no unexpected overdoses.

## 2018-08-29 NOTE — ASSESSMENT & PLAN NOTE
The patient has lost weight since April he faxed lost 9 pounds through diet and exercise. denies any new fatigue, cold/heat intolerance, weight gain/weight loss, diarrhea/constipation, dry skin, myalgia, depressed mood, palpitations, tremmor, hair loss, and no goiter.

## 2018-09-07 ENCOUNTER — TELEPHONE (OUTPATIENT)
Dept: MEDICAL GROUP | Age: 68
End: 2018-09-07

## 2018-09-07 ENCOUNTER — PATIENT MESSAGE (OUTPATIENT)
Dept: MEDICAL GROUP | Age: 68
End: 2018-09-07

## 2018-09-07 RX ORDER — FINASTERIDE 5 MG/1
5 TABLET, FILM COATED ORAL DAILY
Qty: 30 TAB | Refills: 0 | Status: SHIPPED | OUTPATIENT
Start: 2018-09-07 | End: 2019-02-01

## 2018-09-07 RX ORDER — CLOPIDOGREL BISULFATE 75 MG/1
75 TABLET ORAL DAILY
Qty: 90 TAB | Refills: 1 | Status: SHIPPED | OUTPATIENT
Start: 2018-09-07 | End: 2019-04-11 | Stop reason: SDUPTHER

## 2018-09-07 NOTE — TELEPHONE ENCOUNTER
Received notification from Pt's insurance:    Tamsulosin HCl 0.4MG OR CAPS is not covered. Please select an alternative drug from the choices provided and send in a new prescription for that drug.     ALFUZOSIN HCL ER TABS 10MG      FINASTERIDE TABS 5MG 5MG

## 2018-10-12 DIAGNOSIS — E11.69 DIABETES MELLITUS TYPE 2 IN OBESE (HCC): ICD-10-CM

## 2018-10-12 DIAGNOSIS — E66.9 DIABETES MELLITUS TYPE 2 IN OBESE (HCC): ICD-10-CM

## 2018-11-15 DIAGNOSIS — I10 ESSENTIAL HYPERTENSION: ICD-10-CM

## 2018-11-15 RX ORDER — METOPROLOL SUCCINATE 100 MG/1
100 TABLET, EXTENDED RELEASE ORAL DAILY
Qty: 90 TAB | Refills: 0 | Status: SHIPPED | OUTPATIENT
Start: 2018-11-15 | End: 2019-02-14 | Stop reason: SDUPTHER

## 2018-11-15 RX ORDER — POTASSIUM CHLORIDE 750 MG/1
10 CAPSULE, EXTENDED RELEASE ORAL 2 TIMES DAILY
Qty: 60 CAP | Refills: 0 | Status: ON HOLD | OUTPATIENT
Start: 2018-11-15 | End: 2019-02-05

## 2018-12-17 DIAGNOSIS — F41.9 ANXIETY: ICD-10-CM

## 2018-12-18 DIAGNOSIS — F41.9 ANXIETY: ICD-10-CM

## 2018-12-18 RX ORDER — ALPRAZOLAM 1 MG/1
1 TABLET ORAL NIGHTLY PRN
Qty: 90 TAB | Refills: 0 | Status: ON HOLD
Start: 2018-12-18 | End: 2019-02-05

## 2018-12-18 RX ORDER — ALPRAZOLAM 1 MG/1
1 TABLET ORAL NIGHTLY PRN
Qty: 90 TAB | Refills: 0 | Status: CANCELLED | OUTPATIENT
Start: 2018-12-18 | End: 2019-03-18

## 2019-01-14 DIAGNOSIS — E11.69 DIABETES MELLITUS TYPE 2 IN OBESE (HCC): ICD-10-CM

## 2019-01-14 DIAGNOSIS — E66.9 DIABETES MELLITUS TYPE 2 IN OBESE (HCC): ICD-10-CM

## 2019-01-30 ENCOUNTER — PATIENT MESSAGE (OUTPATIENT)
Dept: MEDICAL GROUP | Age: 69
End: 2019-01-30

## 2019-01-30 DIAGNOSIS — I10 ESSENTIAL HYPERTENSION: ICD-10-CM

## 2019-01-31 ENCOUNTER — PATIENT MESSAGE (OUTPATIENT)
Dept: MEDICAL GROUP | Age: 69
End: 2019-01-31

## 2019-01-31 RX ORDER — ATORVASTATIN CALCIUM 80 MG/1
80 TABLET, FILM COATED ORAL DAILY
Qty: 90 TAB | Refills: 3 | Status: SHIPPED | OUTPATIENT
Start: 2019-01-31 | End: 2020-03-05

## 2019-02-01 ENCOUNTER — APPOINTMENT (OUTPATIENT)
Dept: ADMISSIONS | Facility: MEDICAL CENTER | Age: 69
End: 2019-02-01
Attending: OPHTHALMOLOGY
Payer: COMMERCIAL

## 2019-02-01 RX ORDER — CHLORAL HYDRATE 500 MG
1000 CAPSULE ORAL DAILY
COMMUNITY

## 2019-02-05 ENCOUNTER — HOSPITAL ENCOUNTER (OUTPATIENT)
Facility: MEDICAL CENTER | Age: 69
End: 2019-02-05
Attending: OPHTHALMOLOGY | Admitting: OPHTHALMOLOGY
Payer: COMMERCIAL

## 2019-02-05 VITALS
OXYGEN SATURATION: 95 % | TEMPERATURE: 97.6 F | HEIGHT: 70 IN | BODY MASS INDEX: 35.07 KG/M2 | RESPIRATION RATE: 16 BRPM | HEART RATE: 96 BPM | WEIGHT: 244.93 LBS

## 2019-02-05 LAB — GLUCOSE BLD-MCNC: 159 MG/DL (ref 65–99)

## 2019-02-05 PROCEDURE — 160002 HCHG RECOVERY MINUTES (STAT): Performed by: OPHTHALMOLOGY

## 2019-02-05 PROCEDURE — 160035 HCHG PACU - 1ST 60 MINS PHASE I: Performed by: OPHTHALMOLOGY

## 2019-02-05 PROCEDURE — 500855 HCHG NEEDLE, IRRIG CYSTOTOME 27G: Performed by: OPHTHALMOLOGY

## 2019-02-05 PROCEDURE — 700111 HCHG RX REV CODE 636 W/ 250 OVERRIDE (IP)

## 2019-02-05 PROCEDURE — 160048 HCHG OR STATISTICAL LEVEL 1-5: Performed by: OPHTHALMOLOGY

## 2019-02-05 PROCEDURE — 700101 HCHG RX REV CODE 250

## 2019-02-05 PROCEDURE — 82962 GLUCOSE BLOOD TEST: CPT

## 2019-02-05 PROCEDURE — 160025 RECOVERY II MINUTES (STATS): Performed by: OPHTHALMOLOGY

## 2019-02-05 PROCEDURE — 160046 HCHG PACU - 1ST 60 MINS PHASE II: Performed by: OPHTHALMOLOGY

## 2019-02-05 PROCEDURE — 500791 HCHG KNIFE, SLIT: Performed by: OPHTHALMOLOGY

## 2019-02-05 PROCEDURE — 99152 MOD SED SAME PHYS/QHP 5/>YRS: CPT | Performed by: OPHTHALMOLOGY

## 2019-02-05 PROCEDURE — 160029 HCHG SURGERY MINUTES - 1ST 30 MINS LEVEL 4: Performed by: OPHTHALMOLOGY

## 2019-02-05 PROCEDURE — 501749 HCHG SHELL REV 276: Performed by: OPHTHALMOLOGY

## 2019-02-05 DEVICE — LENS PRE-LOADED TECNIS CLEAR MONO 6.0MM 19.5D: Type: IMPLANTABLE DEVICE | Site: EYE | Status: FUNCTIONAL

## 2019-02-05 RX ORDER — PHENYLEPHRINE HYDROCHLORIDE 25 MG/ML
SOLUTION/ DROPS OPHTHALMIC
Status: COMPLETED
Start: 2019-02-05 | End: 2019-02-05

## 2019-02-05 RX ORDER — TETRACAINE HYDROCHLORIDE 5 MG/ML
SOLUTION OPHTHALMIC
Status: COMPLETED
Start: 2019-02-05 | End: 2019-02-05

## 2019-02-05 RX ORDER — TROPICAMIDE 10 MG/ML
SOLUTION/ DROPS OPHTHALMIC
Status: COMPLETED
Start: 2019-02-05 | End: 2019-02-05

## 2019-02-05 RX ORDER — KETOROLAC TROMETHAMINE 5 MG/ML
SOLUTION OPHTHALMIC
Status: DISCONTINUED
Start: 2019-02-05 | End: 2019-02-05 | Stop reason: HOSPADM

## 2019-02-05 RX ORDER — MOXIFLOXACIN 5 MG/ML
SOLUTION/ DROPS OPHTHALMIC
Status: DISCONTINUED | OUTPATIENT
Start: 2019-02-05 | End: 2019-02-05 | Stop reason: HOSPADM

## 2019-02-05 RX ORDER — MOXIFLOXACIN 5 MG/ML
SOLUTION/ DROPS OPHTHALMIC
Status: DISCONTINUED
Start: 2019-02-05 | End: 2019-02-05 | Stop reason: HOSPADM

## 2019-02-05 RX ORDER — MOXIFLOXACIN 5 MG/ML
SOLUTION/ DROPS OPHTHALMIC
Status: COMPLETED
Start: 2019-02-05 | End: 2019-02-05

## 2019-02-05 RX ORDER — METOPROLOL TARTRATE 1 MG/ML
1 INJECTION, SOLUTION INTRAVENOUS
Status: DISCONTINUED | OUTPATIENT
Start: 2019-02-05 | End: 2019-02-05 | Stop reason: HOSPADM

## 2019-02-05 RX ORDER — TETRACAINE HYDROCHLORIDE 5 MG/ML
SOLUTION OPHTHALMIC
Status: DISCONTINUED
Start: 2019-02-05 | End: 2019-02-05 | Stop reason: HOSPADM

## 2019-02-05 RX ORDER — TETRACAINE HYDROCHLORIDE 5 MG/ML
SOLUTION OPHTHALMIC
Status: DISCONTINUED | OUTPATIENT
Start: 2019-02-05 | End: 2019-02-05 | Stop reason: HOSPADM

## 2019-02-05 RX ORDER — KETOROLAC TROMETHAMINE 5 MG/ML
SOLUTION OPHTHALMIC
Status: COMPLETED
Start: 2019-02-05 | End: 2019-02-05

## 2019-02-05 RX ORDER — ONDANSETRON 2 MG/ML
4 INJECTION INTRAMUSCULAR; INTRAVENOUS
Status: DISCONTINUED | OUTPATIENT
Start: 2019-02-05 | End: 2019-02-05 | Stop reason: HOSPADM

## 2019-02-05 RX ORDER — SODIUM CHLORIDE, SODIUM LACTATE, POTASSIUM CHLORIDE, CALCIUM CHLORIDE 600; 310; 30; 20 MG/100ML; MG/100ML; MG/100ML; MG/100ML
INJECTION, SOLUTION INTRAVENOUS ONCE
Status: COMPLETED | OUTPATIENT
Start: 2019-02-05 | End: 2019-02-05

## 2019-02-05 RX ORDER — HALOPERIDOL 5 MG/ML
1 INJECTION INTRAMUSCULAR
Status: DISCONTINUED | OUTPATIENT
Start: 2019-02-05 | End: 2019-02-05 | Stop reason: HOSPADM

## 2019-02-05 RX ORDER — SODIUM CHLORIDE, SODIUM LACTATE, POTASSIUM CHLORIDE, CALCIUM CHLORIDE 600; 310; 30; 20 MG/100ML; MG/100ML; MG/100ML; MG/100ML
INJECTION, SOLUTION INTRAVENOUS CONTINUOUS
Status: DISCONTINUED | OUTPATIENT
Start: 2019-02-05 | End: 2019-02-05 | Stop reason: HOSPADM

## 2019-02-05 RX ADMIN — MOXIFLOXACIN HYDROCHLORIDE 1 DROP: 5 SOLUTION/ DROPS OPHTHALMIC at 10:23

## 2019-02-05 RX ADMIN — PHENYLEPHRINE HYDROCHLORIDE 1 DROP: 2.5 SOLUTION/ DROPS OPHTHALMIC at 10:24

## 2019-02-05 RX ADMIN — TROPICAMIDE 1 DROP: 10 SOLUTION/ DROPS OPHTHALMIC at 10:22

## 2019-02-05 RX ADMIN — SODIUM CHLORIDE, SODIUM LACTATE, POTASSIUM CHLORIDE, CALCIUM CHLORIDE: 600; 310; 30; 20 INJECTION, SOLUTION INTRAVENOUS at 10:50

## 2019-02-05 RX ADMIN — TETRACAINE HYDROCHLORIDE 1 DROP: 5 SOLUTION OPHTHALMIC at 10:18

## 2019-02-05 RX ADMIN — KETOROLAC TROMETHAMINE 1 DROP: 5 SOLUTION OPHTHALMIC at 10:22

## 2019-02-05 NOTE — OR NURSING
1125   Pt arrived to PACU from OR A&Ox3.  Vs stable    1146  Instructions read by JOSÉ MANUEL Cabral.  Pt meets d/c criteria.  Pt discharged to home with wife.  Ambulatory

## 2019-02-05 NOTE — DISCHARGE INSTRUCTIONS
HOME CARE INSTRUCTIONS FOR CATARACT SURGERY    ACTIVITY: Rest and take it easy for the first 24 hours. We strongly suggest that a responsible adult remain with you during that time. It is normal to feel sleepy. We encourage you to not do anything that requires balance, judgment or coordination. Be extra careful when walking (with a dilated eye, it is easier to trip and fall).     FOR 24 HOURS, DO NOT:       Drive, operate machinery or run household appliances.        Drink beer or alcoholic beverages.        Make important decisions or sign legal documents.     DIET: To avoid nausea, slowly advance diet as tolerated, avoiding spicy or greasy foods for the first meal.     MEDICATIONS: Resume taking daily medication. You may take Tylenol for mild discomfort, if needed.     SURGICAL DRESSING: Eye shield as instructed by your doctor. Dark glasses should be worn while in the sunlight.     Follow your Physician's instruction Sheet. Eye Kit Given:  A follow-up appointment is scheduled with your doctor tomorrow at ___08:15____ am    You should call 911 if you develop problems with breathing or chest pain.  You should CALL YOUR PHYSICIAN if you develop: Sharp stabbing pain or sudden change in vision in your operative eye. If you are unable to contact your doctor or the surgical center, you should go to the nearest emergency room or urgent care center. Physician's telephone # _____Dr. De Los Santos [phone]_____________________    If any questions arise, call your doctor. If your doctor is not available, please feel free to call Same Day Surgery at [phone]. You can also call the [facility] Hotline open 24 hours/day, 7 days/week and speak to a nurse at [phone] or [phone].     I acknowledge receipt and understanding of these Home Care Instructions.    ______________________________     _______________________________            Signature of Patient / Responsible Adult                                                        RN Signature    A registered nurse may call you a few days after your surgery to see how you are doing.   You may also receive a survey in the mail within the next two weeks and we ask that you take a few moments to complete and return the survey. Our goal is to provide you with very good care and we value your comments. Thank you for choosing [facility].

## 2019-02-14 DIAGNOSIS — I10 ESSENTIAL HYPERTENSION: ICD-10-CM

## 2019-02-14 RX ORDER — LOSARTAN POTASSIUM 100 MG/1
100 TABLET ORAL DAILY
Qty: 30 TAB | Refills: 0 | Status: SHIPPED | OUTPATIENT
Start: 2019-02-14 | End: 2019-03-19 | Stop reason: SDUPTHER

## 2019-03-06 ENCOUNTER — PATIENT MESSAGE (OUTPATIENT)
Dept: MEDICAL GROUP | Age: 69
End: 2019-03-06

## 2019-03-08 RX ORDER — POTASSIUM CHLORIDE 750 MG/1
10 TABLET, EXTENDED RELEASE ORAL 2 TIMES DAILY
Qty: 180 TAB | Refills: 3 | Status: SHIPPED | OUTPATIENT
Start: 2019-03-08 | End: 2020-03-23

## 2019-03-19 ENCOUNTER — PATIENT MESSAGE (OUTPATIENT)
Dept: MEDICAL GROUP | Age: 69
End: 2019-03-19

## 2019-03-19 DIAGNOSIS — I10 ESSENTIAL HYPERTENSION: ICD-10-CM

## 2019-03-19 RX ORDER — LOSARTAN POTASSIUM 100 MG/1
100 TABLET ORAL DAILY
Qty: 30 TAB | Refills: 0 | Status: SHIPPED | OUTPATIENT
Start: 2019-03-19 | End: 2019-03-20 | Stop reason: SDUPTHER

## 2019-03-20 NOTE — PATIENT COMMUNICATION
Was the patient seen in the last year in this department? Yes    Does patient have an active prescription for medications requested? Yes    Received Request Via: Patient     Pt requesting 90 day supply

## 2019-03-21 RX ORDER — LOSARTAN POTASSIUM 100 MG/1
100 TABLET ORAL DAILY
Qty: 90 TAB | Refills: 0 | Status: SHIPPED | OUTPATIENT
Start: 2019-03-21 | End: 2019-04-17 | Stop reason: SDUPTHER

## 2019-04-17 DIAGNOSIS — E11.69 DIABETES MELLITUS TYPE 2 IN OBESE (HCC): ICD-10-CM

## 2019-04-17 DIAGNOSIS — E66.9 DIABETES MELLITUS TYPE 2 IN OBESE (HCC): ICD-10-CM

## 2019-04-17 DIAGNOSIS — I10 ESSENTIAL HYPERTENSION: ICD-10-CM

## 2019-04-18 RX ORDER — LOSARTAN POTASSIUM 100 MG/1
100 TABLET ORAL DAILY
Qty: 90 TAB | Refills: 0 | Status: SHIPPED | OUTPATIENT
Start: 2019-04-18 | End: 2019-07-30 | Stop reason: SDUPTHER

## 2019-05-10 ENCOUNTER — PATIENT MESSAGE (OUTPATIENT)
Dept: MEDICAL GROUP | Age: 69
End: 2019-05-10

## 2019-05-10 RX ORDER — IBUPROFEN 800 MG/1
800 TABLET ORAL PRN
Qty: 30 TAB | Refills: 0 | Status: SHIPPED | OUTPATIENT
Start: 2019-05-10

## 2019-05-14 NOTE — PATIENT COMMUNICATION
Fax Received From:   [facility] #[id number] - [place], [place] - [address]  [address] [place] [address]  Phone: [phone] Fax: [phone]      Message: Jeevan needs to know the frequency for the medication Ibuprofen in order to fill the prescription for the patient.

## 2019-05-28 ENCOUNTER — PATIENT MESSAGE (OUTPATIENT)
Dept: MEDICAL GROUP | Age: 69
End: 2019-05-28

## 2019-05-28 DIAGNOSIS — E66.9 DIABETES MELLITUS TYPE 2 IN OBESE (HCC): ICD-10-CM

## 2019-05-28 DIAGNOSIS — E11.69 DIABETES MELLITUS TYPE 2 IN OBESE (HCC): ICD-10-CM

## 2019-05-29 NOTE — TELEPHONE ENCOUNTER
Was the [id number] seen in the last year in this department? Yes    Does patient have an active prescription for medications requested? Yes    Received Request Via: Pharmacy

## 2019-05-30 DIAGNOSIS — E66.9 DIABETES MELLITUS TYPE 2 IN OBESE (HCC): ICD-10-CM

## 2019-05-30 DIAGNOSIS — E11.69 DIABETES MELLITUS TYPE 2 IN OBESE (HCC): ICD-10-CM

## 2019-07-02 DIAGNOSIS — F41.9 ANXIETY: ICD-10-CM

## 2019-07-03 RX ORDER — ALPRAZOLAM 1 MG/1
TABLET ORAL
Qty: 20 TAB | Refills: 0 | Status: SHIPPED
Start: 2019-07-03 | End: 2019-10-01

## 2019-07-18 ENCOUNTER — PATIENT MESSAGE (OUTPATIENT)
Dept: MEDICAL GROUP | Age: 69
End: 2019-07-18

## 2019-07-18 DIAGNOSIS — E66.9 DIABETES MELLITUS TYPE 2 IN OBESE (HCC): ICD-10-CM

## 2019-07-18 DIAGNOSIS — I10 ESSENTIAL HYPERTENSION: ICD-10-CM

## 2019-07-18 DIAGNOSIS — E11.69 DIABETES MELLITUS TYPE 2 IN OBESE (HCC): ICD-10-CM

## 2019-07-25 DIAGNOSIS — E66.9 DIABETES MELLITUS TYPE 2 IN OBESE (HCC): ICD-10-CM

## 2019-07-25 DIAGNOSIS — E11.69 DIABETES MELLITUS TYPE 2 IN OBESE (HCC): ICD-10-CM

## 2019-07-30 DIAGNOSIS — I10 ESSENTIAL HYPERTENSION: ICD-10-CM

## 2019-07-30 RX ORDER — LOSARTAN POTASSIUM 100 MG/1
100 TABLET ORAL DAILY
Qty: 100 TAB | Refills: 2 | Status: SHIPPED | OUTPATIENT
Start: 2019-07-30 | End: 2020-06-19

## 2019-08-01 DIAGNOSIS — L40.9 PSORIASIS: ICD-10-CM

## 2019-08-01 RX ORDER — CLOBETASOL PROPIONATE 0.5 MG/G
OINTMENT TOPICAL
Qty: 30 G | Refills: 0 | Status: SHIPPED | OUTPATIENT
Start: 2019-08-01 | End: 2019-10-22 | Stop reason: SDUPTHER

## 2019-10-21 DIAGNOSIS — L40.9 PSORIASIS: ICD-10-CM

## 2019-10-21 NOTE — TELEPHONE ENCOUNTER
Was the patient seen in the last year in this department? Yes    Does patient have an active prescription for medications requested? Yes    Received Request Via: Patient      Patient is also requesting Alprazolam 1 MG Tablet however, I do not see it in his chart. Please advise.

## 2019-10-22 DIAGNOSIS — E11.69 DIABETES MELLITUS TYPE 2 IN OBESE (HCC): ICD-10-CM

## 2019-10-22 DIAGNOSIS — E66.9 DIABETES MELLITUS TYPE 2 IN OBESE (HCC): ICD-10-CM

## 2019-10-22 RX ORDER — CLOBETASOL PROPIONATE 0.5 MG/G
OINTMENT TOPICAL
Qty: 30 G | Refills: 0 | Status: SHIPPED | OUTPATIENT
Start: 2019-10-22

## 2019-10-24 NOTE — TELEPHONE ENCOUNTER
Was the patient seen in the last year in this department? No    Does patient have an active prescription for medications requested? Yes    Received Request Via: Pharmacy

## 2019-12-10 DIAGNOSIS — I10 ESSENTIAL HYPERTENSION: ICD-10-CM

## 2019-12-11 RX ORDER — CLOPIDOGREL BISULFATE 75 MG/1
TABLET ORAL
Qty: 90 TAB | Refills: 0 | Status: SHIPPED | OUTPATIENT
Start: 2019-12-11 | End: 2019-12-17 | Stop reason: SDUPTHER

## 2019-12-11 RX ORDER — METOPROLOL SUCCINATE 100 MG/1
TABLET, EXTENDED RELEASE ORAL
Qty: 90 TAB | Refills: 1 | Status: SHIPPED | OUTPATIENT
Start: 2019-12-11 | End: 2020-06-19

## 2019-12-18 DIAGNOSIS — L40.9 PSORIASIS: ICD-10-CM

## 2019-12-18 RX ORDER — CLOBETASOL PROPIONATE 0.5 MG/G
OINTMENT TOPICAL
Qty: 30 G | Refills: 2 | Status: SHIPPED | OUTPATIENT
Start: 2019-12-18 | End: 2020-03-26 | Stop reason: SDUPTHER

## 2020-01-22 DIAGNOSIS — E66.9 DIABETES MELLITUS TYPE 2 IN OBESE: ICD-10-CM

## 2020-01-22 DIAGNOSIS — E11.69 DIABETES MELLITUS TYPE 2 IN OBESE: ICD-10-CM

## 2020-03-04 DIAGNOSIS — I10 ESSENTIAL HYPERTENSION: ICD-10-CM

## 2020-03-05 RX ORDER — ATORVASTATIN CALCIUM 80 MG/1
TABLET, FILM COATED ORAL
Qty: 90 TAB | Refills: 0 | Status: SHIPPED | OUTPATIENT
Start: 2020-03-05 | End: 2020-06-10

## 2020-03-10 NOTE — TELEPHONE ENCOUNTER
Received request via: Pharmacy    Was the patient seen in the last year in this department? No     Does the patient have an active prescription (recently filled or refills available) for medication(s) requested? Yes

## 2020-03-11 RX ORDER — CLOPIDOGREL BISULFATE 75 MG/1
TABLET ORAL
Qty: 90 TAB | Refills: 0 | Status: SHIPPED | OUTPATIENT
Start: 2020-03-11

## 2020-03-24 RX ORDER — POTASSIUM CHLORIDE 750 MG/1
TABLET, EXTENDED RELEASE ORAL
Qty: 180 TAB | Refills: 0 | Status: SHIPPED | OUTPATIENT
Start: 2020-03-24 | End: 2020-07-14

## 2020-03-26 ENCOUNTER — PATIENT MESSAGE (OUTPATIENT)
Dept: MEDICAL GROUP | Age: 70
End: 2020-03-26

## 2020-03-26 DIAGNOSIS — L40.9 PSORIASIS: ICD-10-CM

## 2020-03-27 RX ORDER — CLOBETASOL PROPIONATE 0.5 MG/G
OINTMENT TOPICAL
Qty: 30 G | Refills: 0 | Status: SHIPPED | OUTPATIENT
Start: 2020-03-27 | End: 2020-09-29 | Stop reason: SDUPTHER

## 2020-04-30 DIAGNOSIS — E66.9 DIABETES MELLITUS TYPE 2 IN OBESE: ICD-10-CM

## 2020-04-30 DIAGNOSIS — E11.69 DIABETES MELLITUS TYPE 2 IN OBESE: ICD-10-CM

## 2020-06-09 DIAGNOSIS — I10 ESSENTIAL HYPERTENSION: ICD-10-CM

## 2020-06-10 RX ORDER — ATORVASTATIN CALCIUM 80 MG/1
TABLET, FILM COATED ORAL
Qty: 90 TAB | Refills: 0 | Status: SHIPPED | OUTPATIENT
Start: 2020-06-10 | End: 2020-06-12 | Stop reason: SDUPTHER

## 2020-06-12 DIAGNOSIS — I10 ESSENTIAL HYPERTENSION: ICD-10-CM

## 2020-06-12 RX ORDER — ATORVASTATIN CALCIUM 80 MG/1
TABLET, FILM COATED ORAL
Qty: 90 TAB | Refills: 0 | Status: SHIPPED | OUTPATIENT
Start: 2020-06-12 | End: 2020-09-09 | Stop reason: SDUPTHER

## 2020-06-16 ENCOUNTER — APPOINTMENT (OUTPATIENT)
Dept: MEDICAL GROUP | Age: 70
End: 2020-06-16

## 2020-06-19 DIAGNOSIS — I10 ESSENTIAL HYPERTENSION: ICD-10-CM

## 2020-06-19 RX ORDER — LOSARTAN POTASSIUM 100 MG/1
TABLET ORAL
Qty: 90 TAB | Refills: 1 | Status: SHIPPED | OUTPATIENT
Start: 2020-06-19

## 2020-06-19 RX ORDER — METOPROLOL SUCCINATE 100 MG/1
TABLET, EXTENDED RELEASE ORAL
Qty: 90 TAB | Refills: 0 | Status: SHIPPED | OUTPATIENT
Start: 2020-06-19 | End: 2020-10-01 | Stop reason: SDUPTHER

## 2020-07-14 RX ORDER — POTASSIUM CHLORIDE 750 MG/1
TABLET, EXTENDED RELEASE ORAL
Qty: 180 TAB | Refills: 0 | Status: SHIPPED | OUTPATIENT
Start: 2020-07-14

## 2020-07-29 ENCOUNTER — PATIENT MESSAGE (OUTPATIENT)
Dept: MEDICAL GROUP | Age: 70
End: 2020-07-29

## 2020-07-29 DIAGNOSIS — F51.01 PRIMARY INSOMNIA: ICD-10-CM

## 2020-07-29 RX ORDER — ALPRAZOLAM 1 MG/1
TABLET ORAL
Qty: 10 TAB | Refills: 0 | Status: SHIPPED | OUTPATIENT
Start: 2020-07-29 | End: 2020-10-29

## 2020-07-29 NOTE — PATIENT COMMUNICATION
Received request via: Patient    Was the patient seen in the last year in this department? No     Does the patient have an active prescription (recently filled or refills available) for medication(s) requested? No

## 2020-08-04 DIAGNOSIS — E66.9 DIABETES MELLITUS TYPE 2 IN OBESE: ICD-10-CM

## 2020-08-04 DIAGNOSIS — E11.69 DIABETES MELLITUS TYPE 2 IN OBESE: ICD-10-CM

## 2020-08-05 ENCOUNTER — PATIENT MESSAGE (OUTPATIENT)
Dept: MEDICAL GROUP | Age: 70
End: 2020-08-05

## 2020-08-05 DIAGNOSIS — E11.69 DIABETES MELLITUS TYPE 2 IN OBESE: ICD-10-CM

## 2020-08-05 DIAGNOSIS — E66.9 DIABETES MELLITUS TYPE 2 IN OBESE: ICD-10-CM

## 2020-09-09 DIAGNOSIS — I10 ESSENTIAL HYPERTENSION: ICD-10-CM

## 2020-09-10 RX ORDER — ATORVASTATIN CALCIUM 80 MG/1
TABLET, FILM COATED ORAL
Qty: 90 TAB | Refills: 0 | Status: SHIPPED | OUTPATIENT
Start: 2020-09-10

## 2020-09-29 DIAGNOSIS — L40.9 PSORIASIS: ICD-10-CM

## 2020-09-29 DIAGNOSIS — E11.69 DIABETES MELLITUS TYPE 2 IN OBESE: ICD-10-CM

## 2020-09-29 DIAGNOSIS — E66.9 DIABETES MELLITUS TYPE 2 IN OBESE: ICD-10-CM

## 2020-09-29 RX ORDER — CLOBETASOL PROPIONATE 0.5 MG/G
OINTMENT TOPICAL
Qty: 30 G | Refills: 0 | Status: SHIPPED | OUTPATIENT
Start: 2020-09-29

## 2020-10-01 DIAGNOSIS — I10 ESSENTIAL HYPERTENSION: ICD-10-CM

## 2020-10-01 NOTE — TELEPHONE ENCOUNTER
Received request via: Patient    Was the patient seen in the last year in this department? No     Does the patient have an active prescription (recently filled or refills available) for medication(s) requested? No     Patient is scheduled for 10/08/2020 but needs refill prior to that.

## 2020-10-02 RX ORDER — METOPROLOL SUCCINATE 100 MG/1
100 TABLET, EXTENDED RELEASE ORAL DAILY
Qty: 30 TAB | Refills: 0 | Status: SHIPPED | OUTPATIENT
Start: 2020-10-02

## 2020-11-27 ENCOUNTER — HOSPITAL ENCOUNTER (OUTPATIENT)
Facility: MEDICAL CENTER | Age: 70
End: 2020-11-27
Payer: COMMERCIAL

## 2020-11-28 LAB
COVID ORDER STATUS COVID19: NORMAL
SARS-COV-2 RNA RESP QL NAA+PROBE: DETECTED
SPECIMEN SOURCE: ABNORMAL

## 2021-03-03 DIAGNOSIS — Z23 NEED FOR VACCINATION: ICD-10-CM
